# Patient Record
Sex: FEMALE | Race: WHITE | NOT HISPANIC OR LATINO | URBAN - METROPOLITAN AREA
[De-identification: names, ages, dates, MRNs, and addresses within clinical notes are randomized per-mention and may not be internally consistent; named-entity substitution may affect disease eponyms.]

---

## 2017-01-04 ENCOUNTER — OUTPATIENT (OUTPATIENT)
Dept: OUTPATIENT SERVICES | Facility: HOSPITAL | Age: 67
LOS: 1 days | End: 2017-01-04

## 2017-03-16 ENCOUNTER — OUTPATIENT (OUTPATIENT)
Dept: OUTPATIENT SERVICES | Facility: HOSPITAL | Age: 67
LOS: 1 days | End: 2017-03-16
Payer: MEDICARE

## 2017-03-16 PROCEDURE — 76536 US EXAM OF HEAD AND NECK: CPT | Mod: 26

## 2017-08-23 PROBLEM — Z00.00 ENCOUNTER FOR PREVENTIVE HEALTH EXAMINATION: Noted: 2017-08-23

## 2017-08-24 PROBLEM — C50.911 BREAST CANCER, RIGHT: Status: ACTIVE | Noted: 2017-08-24

## 2017-08-24 PROBLEM — Z80.3 FAMILY HISTORY OF MALIGNANT NEOPLASM OF BREAST: Status: ACTIVE | Noted: 2017-08-24

## 2017-08-24 PROBLEM — K76.0 HEPATIC STEATOSIS: Status: ACTIVE | Noted: 2017-08-24

## 2017-08-24 PROBLEM — Z78.9 NON-SMOKER: Status: ACTIVE | Noted: 2017-08-24

## 2017-08-25 ENCOUNTER — APPOINTMENT (OUTPATIENT)
Dept: SURGICAL ONCOLOGY | Facility: CLINIC | Age: 67
End: 2017-08-25
Payer: MEDICARE

## 2017-08-25 VITALS
HEART RATE: 66 BPM | RESPIRATION RATE: 16 BRPM | HEIGHT: 61 IN | SYSTOLIC BLOOD PRESSURE: 125 MMHG | TEMPERATURE: 98.3 F | OXYGEN SATURATION: 99 % | DIASTOLIC BLOOD PRESSURE: 68 MMHG

## 2017-08-25 DIAGNOSIS — Z80.3 FAMILY HISTORY OF MALIGNANT NEOPLASM OF BREAST: ICD-10-CM

## 2017-08-25 DIAGNOSIS — Z78.9 OTHER SPECIFIED HEALTH STATUS: ICD-10-CM

## 2017-08-25 DIAGNOSIS — K76.0 FATTY (CHANGE OF) LIVER, NOT ELSEWHERE CLASSIFIED: ICD-10-CM

## 2017-08-25 DIAGNOSIS — C50.911 MALIGNANT NEOPLASM OF UNSPECIFIED SITE OF RIGHT FEMALE BREAST: ICD-10-CM

## 2017-08-25 PROCEDURE — 99205 OFFICE O/P NEW HI 60 MIN: CPT

## 2017-08-28 ENCOUNTER — OUTPATIENT (OUTPATIENT)
Dept: OUTPATIENT SERVICES | Facility: HOSPITAL | Age: 67
LOS: 1 days | End: 2017-08-28

## 2017-08-28 DIAGNOSIS — Z00.8 ENCOUNTER FOR OTHER GENERAL EXAMINATION: ICD-10-CM

## 2017-08-30 ENCOUNTER — TRANSCRIPTION ENCOUNTER (OUTPATIENT)
Age: 67
End: 2017-08-30

## 2017-09-01 ENCOUNTER — RESULT REVIEW (OUTPATIENT)
Age: 67
End: 2017-09-01

## 2017-09-01 ENCOUNTER — OUTPATIENT (OUTPATIENT)
Dept: OUTPATIENT SERVICES | Facility: HOSPITAL | Age: 67
LOS: 1 days | End: 2017-09-01
Payer: SELF-PAY

## 2017-09-01 DIAGNOSIS — C50.911 MALIGNANT NEOPLASM OF UNSPECIFIED SITE OF RIGHT FEMALE BREAST: ICD-10-CM

## 2017-09-01 PROCEDURE — 88321 CONSLTJ&REPRT SLD PREP ELSWR: CPT

## 2017-09-05 LAB — SURGICAL PATHOLOGY STUDY: SIGNIFICANT CHANGE UP

## 2017-09-15 ENCOUNTER — OTHER (OUTPATIENT)
Age: 67
End: 2017-09-15

## 2017-09-15 ENCOUNTER — MESSAGE (OUTPATIENT)
Age: 67
End: 2017-09-15

## 2017-09-28 ENCOUNTER — APPOINTMENT (OUTPATIENT)
Dept: CT IMAGING | Facility: CLINIC | Age: 67
End: 2017-09-28

## 2017-09-28 ENCOUNTER — OUTPATIENT (OUTPATIENT)
Dept: OUTPATIENT SERVICES | Facility: HOSPITAL | Age: 67
LOS: 1 days | End: 2017-09-28
Payer: MEDICARE

## 2017-09-28 DIAGNOSIS — Z00.8 ENCOUNTER FOR OTHER GENERAL EXAMINATION: ICD-10-CM

## 2017-09-28 PROCEDURE — 74174 CTA ABD&PLVS W/CONTRAST: CPT | Mod: 26

## 2017-09-28 PROCEDURE — 74174 CTA ABD&PLVS W/CONTRAST: CPT

## 2017-09-28 PROCEDURE — 82565 ASSAY OF CREATININE: CPT

## 2017-10-13 ENCOUNTER — OUTPATIENT (OUTPATIENT)
Dept: OUTPATIENT SERVICES | Facility: HOSPITAL | Age: 67
LOS: 1 days | End: 2017-10-13
Payer: MEDICARE

## 2017-10-13 VITALS
HEART RATE: 60 BPM | TEMPERATURE: 98 F | SYSTOLIC BLOOD PRESSURE: 132 MMHG | DIASTOLIC BLOOD PRESSURE: 77 MMHG | HEIGHT: 61 IN | RESPIRATION RATE: 20 BRPM | WEIGHT: 166.89 LBS | OXYGEN SATURATION: 98 %

## 2017-10-13 DIAGNOSIS — Z86.79 PERSONAL HISTORY OF OTHER DISEASES OF THE CIRCULATORY SYSTEM: ICD-10-CM

## 2017-10-13 DIAGNOSIS — G47.33 OBSTRUCTIVE SLEEP APNEA (ADULT) (PEDIATRIC): ICD-10-CM

## 2017-10-13 DIAGNOSIS — Z85.3 PERSONAL HISTORY OF MALIGNANT NEOPLASM OF BREAST: ICD-10-CM

## 2017-10-13 DIAGNOSIS — Z01.818 ENCOUNTER FOR OTHER PREPROCEDURAL EXAMINATION: ICD-10-CM

## 2017-10-13 DIAGNOSIS — C50.411 MALIGNANT NEOPLASM OF UPPER-OUTER QUADRANT OF RIGHT FEMALE BREAST: ICD-10-CM

## 2017-10-13 DIAGNOSIS — Z98.891 HISTORY OF UTERINE SCAR FROM PREVIOUS SURGERY: Chronic | ICD-10-CM

## 2017-10-13 DIAGNOSIS — Z90.13 ACQUIRED ABSENCE OF BILATERAL BREASTS AND NIPPLES: ICD-10-CM

## 2017-10-13 DIAGNOSIS — E03.9 HYPOTHYROIDISM, UNSPECIFIED: ICD-10-CM

## 2017-10-13 DIAGNOSIS — Z90.710 ACQUIRED ABSENCE OF BOTH CERVIX AND UTERUS: Chronic | ICD-10-CM

## 2017-10-13 DIAGNOSIS — Z90.89 ACQUIRED ABSENCE OF OTHER ORGANS: Chronic | ICD-10-CM

## 2017-10-13 LAB
BLD GP AB SCN SERPL QL: NEGATIVE — SIGNIFICANT CHANGE UP
RH IG SCN BLD-IMP: POSITIVE — SIGNIFICANT CHANGE UP

## 2017-10-13 PROCEDURE — 86901 BLOOD TYPING SEROLOGIC RH(D): CPT

## 2017-10-13 PROCEDURE — 86900 BLOOD TYPING SEROLOGIC ABO: CPT

## 2017-10-13 PROCEDURE — 86850 RBC ANTIBODY SCREEN: CPT

## 2017-10-13 PROCEDURE — G0463: CPT

## 2017-10-13 RX ORDER — VANCOMYCIN HCL 1 G
1000 VIAL (EA) INTRAVENOUS ONCE
Qty: 0 | Refills: 0 | Status: DISCONTINUED | OUTPATIENT
Start: 2017-10-23 | End: 2017-10-25

## 2017-10-13 NOTE — H&P PST ADULT - PROBLEM SELECTOR PLAN 2
patient will continue antihypertensive medication including day of surgery   patient was evaluated by her cardiologist for upcoming surgery   ekg/ stress/ echo to be obtained from cardiologist office

## 2017-10-13 NOTE — H&P PST ADULT - PROBLEM SELECTOR PLAN 1
scheduled for bilateral mastectomies/ bilateral sentinel lymph node biopsies/ bilateral breast reconstruction/ DEBBI flap.  labs result to be obtained from oncologist office  patient is on Repatha auto, and  will bring injection to hospital the following day, pharmacy was notified.  blood type drawn at presurgical testing

## 2017-10-13 NOTE — H&P PST ADULT - HISTORY OF PRESENT ILLNESS
66 year old female with h/o JONAH (CPAP), HTN, Hypothyroidism, fatty liver, DVT right leg ( 2012), presents to presurgical testing for scheduled bilateral mastectomies/ bilateral sentinel lymph node biopsies/ bilateral breast reconstruction/ DEBBI flap for malignant neoplasm right breast. Patient is gene positive.

## 2017-10-13 NOTE — H&P PST ADULT - PMH
Deep vein thrombosis (DVT)  h/o right lower leg DVT post tibial fracture  Fatty liver    H/O malignant neoplasm of female breast  right breast  H/O osteoporosis    H/O: HTN (hypertension)    Hypothyroidism in adult    JONAH on CPAP

## 2017-10-13 NOTE — H&P PST ADULT - OTHER CARE PROVIDERS
Cardiologist: Dr. Genaro Wu # [phone]                 Oncologist: Dr. Vidal Wakefield # [phone]

## 2017-10-23 ENCOUNTER — RESULT REVIEW (OUTPATIENT)
Age: 67
End: 2017-10-23

## 2017-10-23 ENCOUNTER — INPATIENT (INPATIENT)
Facility: HOSPITAL | Age: 67
LOS: 2 days | Discharge: HOME HEALTH | DRG: 581 | End: 2017-10-26
Attending: PLASTIC SURGERY | Admitting: PLASTIC SURGERY
Payer: MEDICARE

## 2017-10-23 ENCOUNTER — APPOINTMENT (OUTPATIENT)
Dept: NUCLEAR MEDICINE | Facility: HOSPITAL | Age: 67
End: 2017-10-23

## 2017-10-23 VITALS
HEART RATE: 61 BPM | SYSTOLIC BLOOD PRESSURE: 155 MMHG | WEIGHT: 166.89 LBS | DIASTOLIC BLOOD PRESSURE: 80 MMHG | TEMPERATURE: 98 F | RESPIRATION RATE: 18 BRPM | OXYGEN SATURATION: 99 % | HEIGHT: 61 IN

## 2017-10-23 DIAGNOSIS — Z90.13 ACQUIRED ABSENCE OF BILATERAL BREASTS AND NIPPLES: ICD-10-CM

## 2017-10-23 DIAGNOSIS — C50.411 MALIGNANT NEOPLASM OF UPPER-OUTER QUADRANT OF RIGHT FEMALE BREAST: ICD-10-CM

## 2017-10-23 DIAGNOSIS — Z98.891 HISTORY OF UTERINE SCAR FROM PREVIOUS SURGERY: Chronic | ICD-10-CM

## 2017-10-23 DIAGNOSIS — Z90.710 ACQUIRED ABSENCE OF BOTH CERVIX AND UTERUS: Chronic | ICD-10-CM

## 2017-10-23 DIAGNOSIS — Z90.89 ACQUIRED ABSENCE OF OTHER ORGANS: Chronic | ICD-10-CM

## 2017-10-23 LAB
ANION GAP SERPL CALC-SCNC: 17 MMOL/L — SIGNIFICANT CHANGE UP (ref 5–17)
BUN SERPL-MCNC: 10 MG/DL — SIGNIFICANT CHANGE UP (ref 7–23)
CALCIUM SERPL-MCNC: 7.3 MG/DL — LOW (ref 8.4–10.5)
CHLORIDE SERPL-SCNC: 97 MMOL/L — SIGNIFICANT CHANGE UP (ref 96–108)
CO2 SERPL-SCNC: 21 MMOL/L — LOW (ref 22–31)
CREAT SERPL-MCNC: 0.75 MG/DL — SIGNIFICANT CHANGE UP (ref 0.5–1.3)
GLUCOSE SERPL-MCNC: 193 MG/DL — HIGH (ref 70–99)
HCT VFR BLD CALC: 35.6 % — SIGNIFICANT CHANGE UP (ref 34.5–45)
HGB BLD-MCNC: 11.7 G/DL — SIGNIFICANT CHANGE UP (ref 11.5–15.5)
MCHC RBC-ENTMCNC: 28.5 PG — SIGNIFICANT CHANGE UP (ref 27–34)
MCHC RBC-ENTMCNC: 32.9 GM/DL — SIGNIFICANT CHANGE UP (ref 32–36)
MCV RBC AUTO: 86.6 FL — SIGNIFICANT CHANGE UP (ref 80–100)
PLATELET # BLD AUTO: ABNORMAL (ref 150–400)
POTASSIUM SERPL-MCNC: 4.2 MMOL/L — SIGNIFICANT CHANGE UP (ref 3.5–5.3)
POTASSIUM SERPL-SCNC: 4.2 MMOL/L — SIGNIFICANT CHANGE UP (ref 3.5–5.3)
RBC # BLD: 4.11 M/UL — SIGNIFICANT CHANGE UP (ref 3.8–5.2)
RBC # FLD: 12.8 % — SIGNIFICANT CHANGE UP (ref 10.3–14.5)
SODIUM SERPL-SCNC: 135 MMOL/L — SIGNIFICANT CHANGE UP (ref 135–145)
WBC # BLD: 13.5 K/UL — HIGH (ref 3.8–10.5)
WBC # FLD AUTO: 13.5 K/UL — HIGH (ref 3.8–10.5)

## 2017-10-23 PROCEDURE — 88331 PATH CONSLTJ SURG 1 BLK 1SPC: CPT | Mod: 26

## 2017-10-23 PROCEDURE — 88302 TISSUE EXAM BY PATHOLOGIST: CPT | Mod: 26

## 2017-10-23 PROCEDURE — 88360 TUMOR IMMUNOHISTOCHEM/MANUAL: CPT | Mod: 26,59

## 2017-10-23 PROCEDURE — 88307 TISSUE EXAM BY PATHOLOGIST: CPT | Mod: 26

## 2017-10-23 RX ORDER — EVOLOCUMAB 140 MG/ML
0 INJECTION, SOLUTION SUBCUTANEOUS
Qty: 0 | Refills: 0 | COMMUNITY

## 2017-10-23 RX ORDER — KETOROLAC TROMETHAMINE 30 MG/ML
15 SYRINGE (ML) INJECTION EVERY 6 HOURS
Qty: 0 | Refills: 0 | Status: DISCONTINUED | OUTPATIENT
Start: 2017-10-23 | End: 2017-10-24

## 2017-10-23 RX ORDER — MORPHINE SULFATE 50 MG/1
30 CAPSULE, EXTENDED RELEASE ORAL
Qty: 0 | Refills: 0 | Status: DISCONTINUED | OUTPATIENT
Start: 2017-10-23 | End: 2017-10-24

## 2017-10-23 RX ORDER — MORPHINE SULFATE 50 MG/1
2 CAPSULE, EXTENDED RELEASE ORAL
Qty: 0 | Refills: 0 | Status: DISCONTINUED | OUTPATIENT
Start: 2017-10-23 | End: 2017-10-24

## 2017-10-23 RX ORDER — ACETAMINOPHEN 500 MG
975 TABLET ORAL EVERY 8 HOURS
Qty: 0 | Refills: 0 | Status: DISCONTINUED | OUTPATIENT
Start: 2017-10-23 | End: 2017-10-26

## 2017-10-23 RX ORDER — SODIUM CHLORIDE 9 MG/ML
3 INJECTION INTRAMUSCULAR; INTRAVENOUS; SUBCUTANEOUS EVERY 8 HOURS
Qty: 0 | Refills: 0 | Status: DISCONTINUED | OUTPATIENT
Start: 2017-10-23 | End: 2017-10-23

## 2017-10-23 RX ORDER — LOSARTAN POTASSIUM 100 MG/1
1 TABLET, FILM COATED ORAL
Qty: 0 | Refills: 0 | COMMUNITY

## 2017-10-23 RX ORDER — ANASTROZOLE 1 MG/1
1 TABLET ORAL
Qty: 0 | Refills: 0 | COMMUNITY

## 2017-10-23 RX ORDER — LIDOCAINE HCL 20 MG/ML
0.2 VIAL (ML) INJECTION ONCE
Qty: 0 | Refills: 0 | Status: DISCONTINUED | OUTPATIENT
Start: 2017-10-23 | End: 2017-10-23

## 2017-10-23 RX ORDER — ZOLPIDEM TARTRATE 10 MG/1
1 TABLET ORAL
Qty: 0 | Refills: 0 | COMMUNITY

## 2017-10-23 RX ORDER — MORPHINE SULFATE 50 MG/1
2 CAPSULE, EXTENDED RELEASE ORAL
Qty: 0 | Refills: 0 | Status: DISCONTINUED | OUTPATIENT
Start: 2017-10-23 | End: 2017-10-25

## 2017-10-23 RX ORDER — ONDANSETRON 8 MG/1
4 TABLET, FILM COATED ORAL ONCE
Qty: 0 | Refills: 0 | Status: DISCONTINUED | OUTPATIENT
Start: 2017-10-23 | End: 2017-10-24

## 2017-10-23 RX ORDER — DOCUSATE SODIUM 100 MG
100 CAPSULE ORAL THREE TIMES A DAY
Qty: 0 | Refills: 0 | Status: DISCONTINUED | OUTPATIENT
Start: 2017-10-23 | End: 2017-10-26

## 2017-10-23 RX ORDER — LEVOTHYROXINE SODIUM 125 MCG
1 TABLET ORAL
Qty: 0 | Refills: 0 | COMMUNITY

## 2017-10-23 RX ORDER — HEPARIN SODIUM 5000 [USP'U]/ML
5000 INJECTION INTRAVENOUS; SUBCUTANEOUS EVERY 12 HOURS
Qty: 0 | Refills: 0 | Status: DISCONTINUED | OUTPATIENT
Start: 2017-10-23 | End: 2017-10-26

## 2017-10-23 RX ORDER — NALOXONE HYDROCHLORIDE 4 MG/.1ML
0.1 SPRAY NASAL
Qty: 0 | Refills: 0 | Status: DISCONTINUED | OUTPATIENT
Start: 2017-10-23 | End: 2017-10-25

## 2017-10-23 RX ORDER — SODIUM CHLORIDE 9 MG/ML
1000 INJECTION, SOLUTION INTRAVENOUS
Qty: 0 | Refills: 0 | Status: DISCONTINUED | OUTPATIENT
Start: 2017-10-23 | End: 2017-10-25

## 2017-10-23 RX ORDER — ONDANSETRON 8 MG/1
4 TABLET, FILM COATED ORAL EVERY 6 HOURS
Qty: 0 | Refills: 0 | Status: DISCONTINUED | OUTPATIENT
Start: 2017-10-23 | End: 2017-10-26

## 2017-10-23 RX ADMIN — Medication 15 MILLIGRAM(S): at 23:29

## 2017-10-23 RX ADMIN — Medication 100 MILLIGRAM(S): at 22:29

## 2017-10-23 RX ADMIN — SODIUM CHLORIDE 3 MILLILITER(S): 9 INJECTION INTRAMUSCULAR; INTRAVENOUS; SUBCUTANEOUS at 06:57

## 2017-10-23 RX ADMIN — Medication 15 MILLIGRAM(S): at 23:45

## 2017-10-23 RX ADMIN — Medication 100 MILLIGRAM(S): at 23:27

## 2017-10-23 RX ADMIN — MORPHINE SULFATE 30 MILLILITER(S): 50 CAPSULE, EXTENDED RELEASE ORAL at 19:37

## 2017-10-23 RX ADMIN — MORPHINE SULFATE 30 MILLILITER(S): 50 CAPSULE, EXTENDED RELEASE ORAL at 22:09

## 2017-10-23 RX ADMIN — SODIUM CHLORIDE 125 MILLILITER(S): 9 INJECTION, SOLUTION INTRAVENOUS at 19:40

## 2017-10-23 RX ADMIN — Medication 975 MILLIGRAM(S): at 23:28

## 2017-10-23 NOTE — CHART NOTE - NSCHARTNOTEFT_GEN_A_CORE
Patient seen and examined.    Flaps soft and pink with 2-3 second capillary refill. Vioptix Right 58%, Left 65%.   Mastectomy skin flap ecchymosis noted.   No collections.  Abdomen (sans umbilicus) without collections.    Continue current management.

## 2017-10-23 NOTE — BRIEF OPERATIVE NOTE - PROCEDURE
<<-----Click on this checkbox to enter Procedure Breast reconstruction with DEBBI free flap  10/23/2017    Active  TSCOLARO

## 2017-10-24 LAB
ANION GAP SERPL CALC-SCNC: 13 MMOL/L — SIGNIFICANT CHANGE UP (ref 5–17)
BUN SERPL-MCNC: 9 MG/DL — SIGNIFICANT CHANGE UP (ref 7–23)
CALCIUM SERPL-MCNC: 7.6 MG/DL — LOW (ref 8.4–10.5)
CHLORIDE SERPL-SCNC: 98 MMOL/L — SIGNIFICANT CHANGE UP (ref 96–108)
CO2 SERPL-SCNC: 26 MMOL/L — SIGNIFICANT CHANGE UP (ref 22–31)
CREAT SERPL-MCNC: 0.67 MG/DL — SIGNIFICANT CHANGE UP (ref 0.5–1.3)
GLUCOSE SERPL-MCNC: 141 MG/DL — HIGH (ref 70–99)
HCT VFR BLD CALC: 32.5 % — LOW (ref 34.5–45)
HGB BLD-MCNC: 11.3 G/DL — LOW (ref 11.5–15.5)
MCHC RBC-ENTMCNC: 30.2 PG — SIGNIFICANT CHANGE UP (ref 27–34)
MCHC RBC-ENTMCNC: 34.8 GM/DL — SIGNIFICANT CHANGE UP (ref 32–36)
MCV RBC AUTO: 86.8 FL — SIGNIFICANT CHANGE UP (ref 80–100)
PLATELET # BLD AUTO: 140 K/UL — LOW (ref 150–400)
POTASSIUM SERPL-MCNC: 3.9 MMOL/L — SIGNIFICANT CHANGE UP (ref 3.5–5.3)
POTASSIUM SERPL-SCNC: 3.9 MMOL/L — SIGNIFICANT CHANGE UP (ref 3.5–5.3)
RBC # BLD: 3.74 M/UL — LOW (ref 3.8–5.2)
RBC # FLD: 12.6 % — SIGNIFICANT CHANGE UP (ref 10.3–14.5)
SODIUM SERPL-SCNC: 137 MMOL/L — SIGNIFICANT CHANGE UP (ref 135–145)
WBC # BLD: 12.4 K/UL — HIGH (ref 3.8–10.5)
WBC # FLD AUTO: 12.4 K/UL — HIGH (ref 3.8–10.5)

## 2017-10-24 RX ORDER — MORPHINE SULFATE 50 MG/1
30 CAPSULE, EXTENDED RELEASE ORAL
Qty: 0 | Refills: 0 | Status: DISCONTINUED | OUTPATIENT
Start: 2017-10-24 | End: 2017-10-25

## 2017-10-24 RX ADMIN — Medication 500 MILLIGRAM(S): at 18:23

## 2017-10-24 RX ADMIN — MORPHINE SULFATE 30 MILLILITER(S): 50 CAPSULE, EXTENDED RELEASE ORAL at 22:35

## 2017-10-24 RX ADMIN — ONDANSETRON 4 MILLIGRAM(S): 8 TABLET, FILM COATED ORAL at 00:01

## 2017-10-24 RX ADMIN — MORPHINE SULFATE 30 MILLILITER(S): 50 CAPSULE, EXTENDED RELEASE ORAL at 09:46

## 2017-10-24 RX ADMIN — Medication 15 MILLIGRAM(S): at 06:08

## 2017-10-24 RX ADMIN — ONDANSETRON 4 MILLIGRAM(S): 8 TABLET, FILM COATED ORAL at 13:09

## 2017-10-24 RX ADMIN — SODIUM CHLORIDE 125 MILLILITER(S): 9 INJECTION, SOLUTION INTRAVENOUS at 04:00

## 2017-10-24 RX ADMIN — Medication 500 MILLIGRAM(S): at 06:40

## 2017-10-24 RX ADMIN — Medication 975 MILLIGRAM(S): at 08:00

## 2017-10-24 RX ADMIN — ONDANSETRON 4 MILLIGRAM(S): 8 TABLET, FILM COATED ORAL at 06:00

## 2017-10-24 RX ADMIN — Medication 500 MILLIGRAM(S): at 06:07

## 2017-10-24 RX ADMIN — Medication 15 MILLIGRAM(S): at 06:20

## 2017-10-24 RX ADMIN — Medication 975 MILLIGRAM(S): at 15:50

## 2017-10-24 RX ADMIN — Medication 100 MILLIGRAM(S): at 20:52

## 2017-10-24 RX ADMIN — Medication 975 MILLIGRAM(S): at 23:04

## 2017-10-24 RX ADMIN — Medication 202 MILLIGRAM(S): at 08:25

## 2017-10-24 RX ADMIN — ONDANSETRON 4 MILLIGRAM(S): 8 TABLET, FILM COATED ORAL at 23:04

## 2017-10-24 RX ADMIN — Medication 975 MILLIGRAM(S): at 00:00

## 2017-10-24 RX ADMIN — HEPARIN SODIUM 5000 UNIT(S): 5000 INJECTION INTRAVENOUS; SUBCUTANEOUS at 18:22

## 2017-10-24 RX ADMIN — Medication 100 MILLIGRAM(S): at 06:07

## 2017-10-24 RX ADMIN — Medication 975 MILLIGRAM(S): at 07:00

## 2017-10-24 RX ADMIN — Medication 975 MILLIGRAM(S): at 23:30

## 2017-10-24 RX ADMIN — Medication 100 MILLIGRAM(S): at 15:20

## 2017-10-24 RX ADMIN — HEPARIN SODIUM 5000 UNIT(S): 5000 INJECTION INTRAVENOUS; SUBCUTANEOUS at 06:08

## 2017-10-24 RX ADMIN — Medication 15 MILLIGRAM(S): at 13:30

## 2017-10-24 RX ADMIN — Medication 100 MILLIGRAM(S): at 15:22

## 2017-10-24 RX ADMIN — MORPHINE SULFATE 30 MILLILITER(S): 50 CAPSULE, EXTENDED RELEASE ORAL at 08:08

## 2017-10-24 RX ADMIN — Medication 15 MILLIGRAM(S): at 13:09

## 2017-10-24 RX ADMIN — MORPHINE SULFATE 30 MILLILITER(S): 50 CAPSULE, EXTENDED RELEASE ORAL at 19:12

## 2017-10-24 RX ADMIN — Medication 975 MILLIGRAM(S): at 15:21

## 2017-10-24 NOTE — PROGRESS NOTE ADULT - SUBJECTIVE AND OBJECTIVE BOX
POD1 s/p Bilateral mastectomies, SLNBx, and DEBBI flap breast reconstruction.    No major events overnight. Nausea overnight, controlled with IV anti-emetics.    ICU Vital Signs Last 24 Hrs  T(C): 36.8 (24 Oct 2017 06:00), Max: 36.8 (24 Oct 2017 06:00)  T(F): 98.2 (24 Oct 2017 06:00), Max: 98.2 (24 Oct 2017 06:00)  HR: 74 (24 Oct 2017 06:00) (61 - 85)  BP: 106/54 (24 Oct 2017 06:00) (99/58 - 121/57)  BP(mean): 78 (24 Oct 2017 06:00) (70 - 86)  RR: 14 (24 Oct 2017 06:00) (14 - 22)  SpO2: 100% (24 Oct 2017 06:00) (96% - 100%)    Flaps soft, well perfused. Mastectomy skin flaps with mild ecchymosis.  Vx 62, 66.    A/P  Antibiotics  Vioptix  Hills  OOB to chair  Naproxen, Toradol  DVT ppx POD1 s/p Bilateral mastectomies, SLNBx, and DEBBI flap breast reconstruction.    No major events overnight. Nausea overnight, controlled with IV anti-emetics.    ICU Vital Signs Last 24 Hrs  T(C): 36.8 (24 Oct 2017 06:00), Max: 36.8 (24 Oct 2017 06:00)  T(F): 98.2 (24 Oct 2017 06:00), Max: 98.2 (24 Oct 2017 06:00)  HR: 74 (24 Oct 2017 06:00) (61 - 85)  BP: 106/54 (24 Oct 2017 06:00) (99/58 - 121/57)  BP(mean): 78 (24 Oct 2017 06:00) (70 - 86)  RR: 14 (24 Oct 2017 06:00) (14 - 22)  SpO2: 100% (24 Oct 2017 06:00) (96% - 100%)    Flaps soft, well perfused. Mastectomy skin flaps with mild ecchymosis. Abdominal incisions c/d/i. JPs serosang.  Vx 62, 66.    A/P  Reg diet  Antibiotics  Vioptix  Hills  OOB to chair  Naproxen, Toradol  DVT ppx

## 2017-10-24 NOTE — PROGRESS NOTE ADULT - SUBJECTIVE AND OBJECTIVE BOX
Day 1 of Anesthesia Pain Management Service    SUBJECTIVE: Patient is doing well with IV PCA    Pain Scale Score:	[X] Refer to charted pain scores    THERAPY:    [X ] IV PCA Morphine		[X ] 5 mg/mL	[ ] 1 mg/mL  [  ] IV PCA Hydromorphone	[ ] 5 mg/mL	[ ] 1 mg/mL  [ ] IV PCA Fentanyl		[ ] 50 micrograms/mL    Demand dose: 1 mg     Lockout: 6 minutes   Continuous Rate: 0 mg/hr  4 Hour Limit: 12 mg    MEDICATIONS  (STANDING):  acetaminophen   Tablet. 975 milliGRAM(s) Oral every 8 hours  clindamycin IVPB 900 milliGRAM(s) IV Intermittent every 8 hours  docusate sodium 100 milliGRAM(s) Oral three times a day  heparin  Injectable 5000 Unit(s) SubCutaneous every 12 hours  ketorolac   Injectable 15 milliGRAM(s) IV Push every 6 hours  lactated ringers. 1000 milliLiter(s) (125 mL/Hr) IV Continuous <Continuous>  morphine PCA (5 mG/mL) 30 milliLiter(s) PCA Continuous PCA Continuous  naproxen 500 milliGRAM(s) Oral two times a day  vancomycin  IVPB 1000 milliGRAM(s) IV Intermittent once    MEDICATIONS  (PRN):  morphine PCA (5 mG/mL) Rescue Clinician Bolus 2 milliGRAM(s) IV Push every 15 minutes PRN for Pain Scale GREATER THAN 6  naloxone Injectable 0.1 milliGRAM(s) IV Push every 3 minutes PRN For ANY of the following changes in patient status:  A. RR LESS THAN 10 breaths per minute, B. Oxygen saturation LESS THAN 90%, C. Sedation score of 6  ondansetron Injectable 4 milliGRAM(s) IV Push every 6 hours PRN Nausea      OBJECTIVE:    Sedation Score:	[ X] Alert	[ ] Drowsy 	[ ] Arousable	[ ] Asleep	[ ] Unresponsive    Side Effects:	[  ] None	[X ] Nausea: antiemetics PRN	[ ] Vomiting	[ ] Pruritus  		[ ] Other:    Vital Signs Last 24 Hrs  T(C): 36.6 (24 Oct 2017 10:20), Max: 36.8 (24 Oct 2017 06:00)  T(F): 97.9 (24 Oct 2017 10:20), Max: 98.2 (24 Oct 2017 06:00)  HR: 62 (24 Oct 2017 10:20) (61 - 85)  BP: 104/61 (24 Oct 2017 10:20) (97/52 - 121/57)  BP(mean): 68 (24 Oct 2017 07:00) (68 - 86)  RR: 18 (24 Oct 2017 10:20) (14 - 22)  SpO2: 100% (24 Oct 2017 10:20) (96% - 100%)    ASSESSMENT/ PLAN    Therapy to  be:               [X] Continued   [ ] Discontinued   [ ] Changed to PRN Analgesics    Documentation and Verification of current medications:   [X] Done   [ ] Not done, not eligible    Comments: Demand dose decreased to 0.5mg secondary to nausea. Medicated with phenergan IV. Not using PCA. Tylenol po ATC, Toradol and Naprosyn ordered. Reeducated to use.

## 2017-10-24 NOTE — PROGRESS NOTE ADULT - SUBJECTIVE AND OBJECTIVE BOX
No events  Pain controlled  AVSS  Vioptix 64/66  Flaps viable  Skin viable  Inc c/d/i  Dr CONTRERAS    OK to floor  Reg diet  HSQ  ASA 81  Private room (immune def - IgA def)  CPAP PRN

## 2017-10-25 ENCOUNTER — TRANSCRIPTION ENCOUNTER (OUTPATIENT)
Age: 67
End: 2017-10-25

## 2017-10-25 RX ORDER — OXYCODONE HYDROCHLORIDE 5 MG/1
5 TABLET ORAL EVERY 4 HOURS
Qty: 0 | Refills: 0 | Status: DISCONTINUED | OUTPATIENT
Start: 2017-10-25 | End: 2017-10-26

## 2017-10-25 RX ORDER — MOXIFLOXACIN HYDROCHLORIDE TABLETS, 400 MG 400 MG/1
1 TABLET, FILM COATED ORAL
Qty: 28 | Refills: 0 | OUTPATIENT
Start: 2017-10-25 | End: 2017-11-08

## 2017-10-25 RX ORDER — OXYCODONE HYDROCHLORIDE 5 MG/1
5 TABLET ORAL ONCE
Qty: 0 | Refills: 0 | Status: DISCONTINUED | OUTPATIENT
Start: 2017-10-25 | End: 2017-10-25

## 2017-10-25 RX ORDER — DOCOSANOL 100 MG/G
1 CREAM TOPICAL DAILY
Qty: 0 | Refills: 0 | Status: DISCONTINUED | OUTPATIENT
Start: 2017-10-25 | End: 2017-10-26

## 2017-10-25 RX ORDER — OXYCODONE HYDROCHLORIDE 5 MG/1
10 TABLET ORAL EVERY 4 HOURS
Qty: 0 | Refills: 0 | Status: DISCONTINUED | OUTPATIENT
Start: 2017-10-25 | End: 2017-10-26

## 2017-10-25 RX ORDER — DOCOSANOL 100 MG/G
1 CREAM TOPICAL
Qty: 0 | Refills: 0 | Status: DISCONTINUED | OUTPATIENT
Start: 2017-10-25 | End: 2017-10-26

## 2017-10-25 RX ORDER — DIPHENHYDRAMINE HCL 50 MG
50 CAPSULE ORAL EVERY 4 HOURS
Qty: 0 | Refills: 0 | Status: DISCONTINUED | OUTPATIENT
Start: 2017-10-25 | End: 2017-10-26

## 2017-10-25 RX ORDER — ACETAMINOPHEN 500 MG
650 TABLET ORAL EVERY 6 HOURS
Qty: 0 | Refills: 0 | Status: DISCONTINUED | OUTPATIENT
Start: 2017-10-25 | End: 2017-10-26

## 2017-10-25 RX ADMIN — DOCOSANOL 1 APPLICATION(S): 100 CREAM TOPICAL at 18:31

## 2017-10-25 RX ADMIN — Medication 100 MILLIGRAM(S): at 20:56

## 2017-10-25 RX ADMIN — Medication 975 MILLIGRAM(S): at 16:55

## 2017-10-25 RX ADMIN — Medication 100 MILLIGRAM(S): at 05:01

## 2017-10-25 RX ADMIN — ONDANSETRON 4 MILLIGRAM(S): 8 TABLET, FILM COATED ORAL at 05:02

## 2017-10-25 RX ADMIN — Medication 500 MILLIGRAM(S): at 05:02

## 2017-10-25 RX ADMIN — Medication 100 MILLIGRAM(S): at 14:14

## 2017-10-25 RX ADMIN — Medication 975 MILLIGRAM(S): at 23:30

## 2017-10-25 RX ADMIN — OXYCODONE HYDROCHLORIDE 5 MILLIGRAM(S): 5 TABLET ORAL at 18:21

## 2017-10-25 RX ADMIN — Medication 500 MILLIGRAM(S): at 05:45

## 2017-10-25 RX ADMIN — HEPARIN SODIUM 5000 UNIT(S): 5000 INJECTION INTRAVENOUS; SUBCUTANEOUS at 05:02

## 2017-10-25 RX ADMIN — OXYCODONE HYDROCHLORIDE 5 MILLIGRAM(S): 5 TABLET ORAL at 14:12

## 2017-10-25 RX ADMIN — Medication 975 MILLIGRAM(S): at 09:30

## 2017-10-25 RX ADMIN — HEPARIN SODIUM 5000 UNIT(S): 5000 INJECTION INTRAVENOUS; SUBCUTANEOUS at 18:19

## 2017-10-25 RX ADMIN — Medication 500 MILLIGRAM(S): at 18:19

## 2017-10-25 RX ADMIN — Medication 975 MILLIGRAM(S): at 23:05

## 2017-10-25 RX ADMIN — OXYCODONE HYDROCHLORIDE 5 MILLIGRAM(S): 5 TABLET ORAL at 23:04

## 2017-10-25 RX ADMIN — OXYCODONE HYDROCHLORIDE 5 MILLIGRAM(S): 5 TABLET ORAL at 23:28

## 2017-10-25 NOTE — CHART NOTE - NSCHARTNOTEFT_GEN_A_CORE
Patient seen and examined.    Flaps soft, pink, and viable with 2-3 second capillary refill.  Right breast slightly more swollen than left. No collections appreciated.    Continue current management.

## 2017-10-25 NOTE — DISCHARGE NOTE ADULT - MEDICATION SUMMARY - MEDICATIONS TO TAKE
I will START or STAY ON the medications listed below when I get home from the hospital:    Percocet 5/325 oral tablet  -- 1 tab(s) by mouth every 6 hours, As Needed - for moderate pain  -- Indication: For Post-operative pain medication    losartan 50 mg oral tablet  -- 1 tab(s) by mouth once a day  -- Indication: For Home medication    Repatha 140 mg/mL subcutaneous solution  -- every two weeks  last injection 9/11/2017  -- Indication: For Home medication    anastrozole 1 mg oral tablet  -- 1 tab(s) by mouth once a day  -- Indication: For Home medication    zolpidem 10 mg oral tablet  -- 1 tab(s) by mouth once a day (at bedtime)  -- Indication: For Home medication    hydroCHLOROthiazide 12.5 mg oral tablet  -- 1 tab(s) by mouth once a day  -- Indication: For Home medication    Cipro 500 mg oral tablet  -- 1 tab(s) by mouth every 12 hours   -- Avoid prolonged or excessive exposure to direct and/or artificial sunlight while taking this medication.  Check with your doctor before becoming pregnant.  Do not take dairy products, antacids, or iron preparations within one hour of this medication.  Finish all this medication unless otherwise directed by prescriber.  Medication should be taken with plenty of water.    -- Indication: For Post-operative antibiotic    levothyroxine 50 mcg (0.05 mg) oral tablet  -- 1 tab(s) by mouth once a day  -- Indication: For Home medication I will START or STAY ON the medications listed below when I get home from the hospital:    Percocet 5/325 oral tablet  -- 1 tab(s) by mouth every 6 hours, As Needed - for moderate pain  -- Indication: For Post-operative pain medication    aspirin 325 mg oral tablet  -- 1 tab(s) by mouth once a day MDD:1  -- Take with food or milk.    -- Indication: For Microvascular patency    losartan 50 mg oral tablet  -- 1 tab(s) by mouth once a day  -- Indication: For Home medication    Repatha 140 mg/mL subcutaneous solution  -- every two weeks  last injection 9/11/2017  -- Indication: For Home medication    anastrozole 1 mg oral tablet  -- 1 tab(s) by mouth once a day  -- Indication: For Home medication    zolpidem 10 mg oral tablet  -- 1 tab(s) by mouth once a day (at bedtime)  -- Indication: For Home medication    hydroCHLOROthiazide 12.5 mg oral tablet  -- 1 tab(s) by mouth once a day  -- Indication: For Home medication    Cipro 500 mg oral tablet  -- 1 tab(s) by mouth every 12 hours   -- Avoid prolonged or excessive exposure to direct and/or artificial sunlight while taking this medication.  Check with your doctor before becoming pregnant.  Do not take dairy products, antacids, or iron preparations within one hour of this medication.  Finish all this medication unless otherwise directed by prescriber.  Medication should be taken with plenty of water.    -- Indication: For Post-operative antibiotic    levothyroxine 50 mcg (0.05 mg) oral tablet  -- 1 tab(s) by mouth once a day  -- Indication: For Home medication

## 2017-10-25 NOTE — PROGRESS NOTE ADULT - SUBJECTIVE AND OBJECTIVE BOX
SUBJECTIVE:  Doing well.   No overnight events.     OBJECTIVE:     ** VITAL SIGNS / I&O's **    T(C): 37 (10-25-17 @ 06:47), Max: 37.3 (10-24-17 @ 17:10)  T(F): 98.6 (10-25-17 @ 06:47), Max: 99.2 (10-24-17 @ 17:10)  HR: 61 (10-25-17 @ 06:47) (58 - 75)  BP: 126/69 (10-25-17 @ 06:47) (95/58 - 126/69)  RR: 18 (10-25-17 @ 06:47) (18 - 18)  SpO2: 100% (10-25-17 @ 06:47) (98% - 100%)      24 Oct 2017 07:01  -  25 Oct 2017 07:00  --------------------------------------------------------  IN:    IV PiggyBack: 150 mL    lactated ringers.: 2600 mL    Oral Fluid: 360 mL  Total IN: 3110 mL    OUT:    Bulb: 45 mL    Bulb: 65 mL    Bulb: 20 mL    Bulb: 30 mL    Bulb: 30 mL    Bulb: 25 mL    Indwelling Catheter - Urethral: 3555 mL  Total OUT: 3770 mL    Total NET: -660 mL      ** PHYSICAL EXAM **    -- CONSTITUTIONAL: AOx3. NAD.   -- RIGHT BREAST / FLAP: Mastectomy skin flap ecchymosis, stable. No collections. Flap soft and pink with 2-3 second capillary refill. Vioptix: 74%. Drain(s) serosanguinous.  -- LEFT BREAST / FLAP: Mastectomy skin flap ecchymosis, stable. No collections. Flap soft and pink with 2-3 second capillary refill. Vioptix: 68%. Drain(s) serosanguinous.  -- CARDIOVASCULAR: Regular rate and rhythm. S1, S2.  -- RESPIRATORY: Bilateral breath sounds.   -- ABDOMEN: Soft. No collections. Incision intact with overlying Prineo. Drains serosanguinous.

## 2017-10-25 NOTE — PROGRESS NOTE ADULT - SUBJECTIVE AND OBJECTIVE BOX
NAD  Afebrile, VSS  ViOptix 55/64 stable  bilaterally, removed  Bilateral breast free flap viable, closures intact.  Abdomen flat, closure intact.  Drains serosang.  meyer removed    oob ambulate  po pain meds  lovenox

## 2017-10-25 NOTE — PROGRESS NOTE ADULT - ASSESSMENT
66F s/p bilateral mastectomies and bilateral breast reconstruction with DEBBI flaps  >> d/c Hills  >> d/c IVF  >> d/c O2  >> Ambulate as tolerated  >> Pain control with PO analgesia  >> Regular diet  >> DVT prophylaxis  >> Continue antibiotics  >> Continue drains // Drain care  >> LEONORA teaching  >> Dispo planning 66F s/p bilateral mastectomies and bilateral breast reconstruction with DEBBI flaps  >> d/c Hills  >> d/c IVF  >> d/c O2  >> d/c Vioptix per attending surgeon  >> q4H flap monitoring  >> Ambulate as tolerated  >> Pain control with PO analgesia  >> Regular diet  >> DVT prophylaxis  >> Continue antibiotics  >> Continue drains // Drain care  >> LEONORA teaching  >> Dispo planning

## 2017-10-25 NOTE — DISCHARGE NOTE ADULT - CARE PLAN
Principal Discharge DX:	H/O malignant neoplasm of female breast  Goal:	See below  Instructions for follow-up, activity and diet:	Please follow the instructions given to you by Dr. Reyes Principal Discharge DX:	H/O malignant neoplasm of female breast  Goal:	See below  Instructions for follow-up, activity and diet:	Please follow the instructions given to you by Dr. Reyes regarding showering.  Ambulate as tolerated, take diet as tolerated. You will receive nursing visits to help you manage the drains and check your wounds. You should empty and record drain output twice daily.  Pain medication prescription already provided by Dr. Reyes. Take as prescribed.  Post-operative antibiotics have been sent to your [facility]. Please take as prescribed immediately after discharged.

## 2017-10-25 NOTE — PROGRESS NOTE ADULT - SUBJECTIVE AND OBJECTIVE BOX
Day 2 of Anesthesia Pain Management Service    SUBJECTIVE: Patient is doing well with IV PCA    Pain Scale Score:	[X] Refer to charted pain scores    THERAPY:    [X ] IV PCA Morphine		[X ] 5 mg/mL	[ ] 1 mg/mL  [ ] IV PCA Hydromorphone	[ ] 5 mg/mL	[  ] 1 mg/mL  [ ] IV PCA Fentanyl		[ ] 50 micrograms/mL    Demand dose: 0.5 mg     Lockout: 6 minutes   Continuous Rate: 0 mg/hr  4 Hour Limit: 12 mg    MEDICATIONS  (STANDING):  acetaminophen   Tablet. 975 milliGRAM(s) Oral every 8 hours  clindamycin IVPB 900 milliGRAM(s) IV Intermittent every 8 hours  docusate sodium 100 milliGRAM(s) Oral three times a day  heparin  Injectable 5000 Unit(s) SubCutaneous every 12 hours  naproxen 500 milliGRAM(s) Oral two times a day    MEDICATIONS  (PRN):  acetaminophen   Tablet 650 milliGRAM(s) Oral every 6 hours PRN For Temp greater than 38 C (100.4 F)  aluminum hydroxide/magnesium hydroxide/simethicone Suspension 30 milliLiter(s) Oral every 4 hours PRN Dyspepsia  diphenhydrAMINE   Capsule 50 milliGRAM(s) Oral every 4 hours PRN Rash and/or Itching  ondansetron Injectable 4 milliGRAM(s) IV Push every 6 hours PRN Nausea  oxyCODONE    IR 5 milliGRAM(s) Oral every 4 hours PRN Moderate Pain (4 - 6)  oxyCODONE    IR 10 milliGRAM(s) Oral every 4 hours PRN Severe Pain (7 - 10)  promethazine IVPB 12.5 milliGRAM(s) IV Intermittent once PRN Nausea if zofran ineffective      OBJECTIVE:    Sedation Score:	[ X] Alert	[ ] Drowsy 	[ ] Arousable	[ ] Asleep	[ ] Unresponsive    Side Effects:	[X ] None	[ ] Nausea	[ ] Vomiting	[ ] Pruritus  		[ ] Other:    Vital Signs Last 24 Hrs  T(C): 37.1 (25 Oct 2017 10:11), Max: 37.3 (24 Oct 2017 17:10)  T(F): 98.7 (25 Oct 2017 10:11), Max: 99.2 (24 Oct 2017 17:10)  HR: 55 (25 Oct 2017 10:11) (55 - 75)  BP: 110/66 (25 Oct 2017 10:11) (95/58 - 126/69)  BP(mean): --  RR: 18 (25 Oct 2017 10:11) (18 - 18)  SpO2: 95% (25 Oct 2017 10:11) (95% - 100%)    ASSESSMENT/ PLAN    Therapy to  be:               [X] Continued   [ ] Discontinued   [ ] Changed to PRN Analgesics    Documentation and Verification of current medications:   [X] Done	[ ] Not done, not eligible    Comments: PCA D\C'd by primary service

## 2017-10-25 NOTE — PROGRESS NOTE ADULT - SUBJECTIVE AND OBJECTIVE BOX
Pain Management Attending Addendum    SUBJECTIVE: Patient doing well with IV PCA    Therapy:    [X] IV PCA         [ ] PRN Analgesics    OBJECTIVE:   [X] Pain appropriately controlled    [ ] Other:    Side Effects:  [X] None	             [ ] Nausea              [ ] Pruritis                	[ ] Other:    ASSESSMENT/PLAN:  Therapy changed to PRN analgesics    Comments:

## 2017-10-25 NOTE — DISCHARGE NOTE ADULT - HOSPITAL COURSE
66F underwent bilateral mastectomies and bilateral breast reconstruction with DEBBI flaps in the OR. The patient tolerated the procedure well. Postoperatively the patient was sent to the PACU. The patient remained in the PACU overnight. The patient's flaps were monitored by Vioptix and by clinical examination. On POD 1, the patient was hemodynamically stable; was transferred to a surgical floor; was advanced to a regular diet; was placed on her home medications; and was out of bed to a chair. On POD 2, the patient's Hills catheter was removed; Vioptix monitors were removed; and the patient ambulated. During the patient's hospital course, the patient's pain was controlled by IV pain medications and then by PO pain medications.    At the time of discharge, the patient was hemodynamically stable, was tolerating PO diet, was voiding urine, was ambulating, and was comfortable with adequate pain control. The patient was instructed to follow up with Dr. Reyes within x1 week(s) after discharge from the hospital and Dr. Gracia within x1 week(s) after discharge from the hospital. The patient/family felt comfortable with discharge. The patient had no other issues.

## 2017-10-25 NOTE — DISCHARGE NOTE ADULT - CARE PROVIDER_API CALL
Travis Reyes), Plastic Surgery  [address]  [address]  [place] [address]  Phone: [phone]  Fax: [phone]    Audrey Gracia), Surgery  [address]  [address]  [place] [address]  Phone: [phone]  Fax: [phone]

## 2017-10-25 NOTE — DISCHARGE NOTE ADULT - PATIENT PORTAL LINK FT
“You can access the Follow[employer] Patient Portal, offered by [facility], by registering with the following website: [email or web address]”

## 2017-10-25 NOTE — DISCHARGE NOTE ADULT - INSTRUCTIONS
The patient may resume a regular diet. call MD // go to ER:  temperature, nausea, vomiting; check site daily for redness, warmth, swelling, bleeding, drainage with foul odor at drain sites and abdominal incision site

## 2017-10-25 NOTE — DISCHARGE NOTE ADULT - HOME CARE AGENCY
[facility] [phone] will reinforce patricia teaching.  Please call [facility] for any questions regarding your care.

## 2017-10-26 VITALS
DIASTOLIC BLOOD PRESSURE: 73 MMHG | TEMPERATURE: 98 F | OXYGEN SATURATION: 98 % | RESPIRATION RATE: 18 BRPM | SYSTOLIC BLOOD PRESSURE: 149 MMHG | HEART RATE: 59 BPM

## 2017-10-26 LAB — SURGICAL PATHOLOGY STUDY: SIGNIFICANT CHANGE UP

## 2017-10-26 RX ORDER — ASPIRIN/CALCIUM CARB/MAGNESIUM 324 MG
1 TABLET ORAL
Qty: 14 | Refills: 0 | OUTPATIENT
Start: 2017-10-26 | End: 2017-11-05

## 2017-10-26 RX ADMIN — DOCOSANOL 1 APPLICATION(S): 100 CREAM TOPICAL at 05:04

## 2017-10-26 RX ADMIN — OXYCODONE HYDROCHLORIDE 5 MILLIGRAM(S): 5 TABLET ORAL at 08:15

## 2017-10-26 RX ADMIN — Medication 975 MILLIGRAM(S): at 11:00

## 2017-10-26 RX ADMIN — HEPARIN SODIUM 5000 UNIT(S): 5000 INJECTION INTRAVENOUS; SUBCUTANEOUS at 05:03

## 2017-10-26 RX ADMIN — Medication 500 MILLIGRAM(S): at 05:04

## 2017-10-26 RX ADMIN — Medication 500 MILLIGRAM(S): at 05:40

## 2017-10-26 RX ADMIN — Medication 100 MILLIGRAM(S): at 05:04

## 2017-10-26 RX ADMIN — OXYCODONE HYDROCHLORIDE 5 MILLIGRAM(S): 5 TABLET ORAL at 04:00

## 2017-10-26 RX ADMIN — OXYCODONE HYDROCHLORIDE 5 MILLIGRAM(S): 5 TABLET ORAL at 03:02

## 2017-10-27 ENCOUNTER — TRANSCRIPTION ENCOUNTER (OUTPATIENT)
Age: 67
End: 2017-10-27

## 2017-11-22 ENCOUNTER — RESULT REVIEW (OUTPATIENT)
Age: 67
End: 2017-11-22

## 2017-12-19 PROCEDURE — C1889: CPT

## 2017-12-19 PROCEDURE — A9541: CPT

## 2017-12-19 PROCEDURE — C1769: CPT

## 2017-12-19 PROCEDURE — 88331 PATH CONSLTJ SURG 1 BLK 1SPC: CPT

## 2017-12-19 PROCEDURE — 82330 ASSAY OF CALCIUM: CPT

## 2017-12-19 PROCEDURE — 84295 ASSAY OF SERUM SODIUM: CPT

## 2017-12-19 PROCEDURE — 82435 ASSAY OF BLOOD CHLORIDE: CPT

## 2017-12-19 PROCEDURE — C1781: CPT

## 2017-12-19 PROCEDURE — 84132 ASSAY OF SERUM POTASSIUM: CPT

## 2017-12-19 PROCEDURE — 83605 ASSAY OF LACTIC ACID: CPT

## 2017-12-19 PROCEDURE — 85014 HEMATOCRIT: CPT

## 2017-12-19 PROCEDURE — 85027 COMPLETE CBC AUTOMATED: CPT

## 2017-12-19 PROCEDURE — 88302 TISSUE EXAM BY PATHOLOGIST: CPT

## 2017-12-19 PROCEDURE — 88307 TISSUE EXAM BY PATHOLOGIST: CPT

## 2017-12-19 PROCEDURE — 82947 ASSAY GLUCOSE BLOOD QUANT: CPT

## 2017-12-19 PROCEDURE — 80048 BASIC METABOLIC PNL TOTAL CA: CPT

## 2017-12-19 PROCEDURE — 82803 BLOOD GASES ANY COMBINATION: CPT

## 2017-12-19 PROCEDURE — 88360 TUMOR IMMUNOHISTOCHEM/MANUAL: CPT

## 2017-12-19 PROCEDURE — 82565 ASSAY OF CREATININE: CPT

## 2018-01-24 ENCOUNTER — OUTPATIENT (OUTPATIENT)
Dept: OUTPATIENT SERVICES | Facility: HOSPITAL | Age: 68
LOS: 1 days | End: 2018-01-24
Payer: MEDICARE

## 2018-01-24 DIAGNOSIS — Z98.891 HISTORY OF UTERINE SCAR FROM PREVIOUS SURGERY: Chronic | ICD-10-CM

## 2018-01-24 DIAGNOSIS — Z85.3 PERSONAL HISTORY OF MALIGNANT NEOPLASM OF BREAST: ICD-10-CM

## 2018-01-24 DIAGNOSIS — Z90.710 ACQUIRED ABSENCE OF BOTH CERVIX AND UTERUS: Chronic | ICD-10-CM

## 2018-01-24 DIAGNOSIS — Z90.13 ACQUIRED ABSENCE OF BILATERAL BREASTS AND NIPPLES: ICD-10-CM

## 2018-01-24 DIAGNOSIS — N65.0 DEFORMITY OF RECONSTRUCTED BREAST: ICD-10-CM

## 2018-01-24 DIAGNOSIS — Z01.818 ENCOUNTER FOR OTHER PREPROCEDURAL EXAMINATION: ICD-10-CM

## 2018-01-24 DIAGNOSIS — I10 ESSENTIAL (PRIMARY) HYPERTENSION: ICD-10-CM

## 2018-01-24 DIAGNOSIS — Z90.89 ACQUIRED ABSENCE OF OTHER ORGANS: Chronic | ICD-10-CM

## 2018-01-24 PROCEDURE — 93010 ELECTROCARDIOGRAM REPORT: CPT | Mod: NC

## 2018-01-24 PROCEDURE — 36415 COLL VENOUS BLD VENIPUNCTURE: CPT

## 2018-01-24 PROCEDURE — 80048 BASIC METABOLIC PNL TOTAL CA: CPT

## 2018-01-24 PROCEDURE — 93005 ELECTROCARDIOGRAM TRACING: CPT

## 2018-01-24 PROCEDURE — G0463: CPT

## 2018-01-24 PROCEDURE — 85027 COMPLETE CBC AUTOMATED: CPT

## 2018-02-07 ENCOUNTER — TRANSCRIPTION ENCOUNTER (OUTPATIENT)
Age: 68
End: 2018-02-07

## 2018-02-07 ENCOUNTER — OUTPATIENT (OUTPATIENT)
Dept: OUTPATIENT SERVICES | Facility: HOSPITAL | Age: 68
LOS: 1 days | End: 2018-02-07
Payer: MEDICARE

## 2018-02-07 ENCOUNTER — RESULT REVIEW (OUTPATIENT)
Age: 68
End: 2018-02-07

## 2018-02-07 DIAGNOSIS — N65.0 DEFORMITY OF RECONSTRUCTED BREAST: ICD-10-CM

## 2018-02-07 DIAGNOSIS — Z90.89 ACQUIRED ABSENCE OF OTHER ORGANS: Chronic | ICD-10-CM

## 2018-02-07 DIAGNOSIS — Z85.3 PERSONAL HISTORY OF MALIGNANT NEOPLASM OF BREAST: ICD-10-CM

## 2018-02-07 DIAGNOSIS — Z98.891 HISTORY OF UTERINE SCAR FROM PREVIOUS SURGERY: Chronic | ICD-10-CM

## 2018-02-07 DIAGNOSIS — Z90.13 ACQUIRED ABSENCE OF BILATERAL BREASTS AND NIPPLES: ICD-10-CM

## 2018-02-07 DIAGNOSIS — Z90.710 ACQUIRED ABSENCE OF BOTH CERVIX AND UTERUS: Chronic | ICD-10-CM

## 2018-02-07 PROCEDURE — 88304 TISSUE EXAM BY PATHOLOGIST: CPT | Mod: 26

## 2018-02-08 PROCEDURE — 19380 REVJ RECONSTRUCTED BREAST: CPT | Mod: 50

## 2018-02-08 PROCEDURE — 13101 CMPLX RPR TRUNK 2.6-7.5 CM: CPT | Mod: XS

## 2018-02-08 PROCEDURE — 15200 FTH/GFT FR TRNK 20 SQ CM/<: CPT

## 2018-02-08 PROCEDURE — 13102 CMPLX RPR TRUNK ADDL 5CM/<: CPT | Mod: XS

## 2018-02-08 PROCEDURE — 88304 TISSUE EXAM BY PATHOLOGIST: CPT

## 2020-01-03 PROBLEM — Z85.3 PERSONAL HISTORY OF MALIGNANT NEOPLASM OF BREAST: Chronic | Status: ACTIVE | Noted: 2017-10-13

## 2020-01-03 PROBLEM — K76.0 FATTY (CHANGE OF) LIVER, NOT ELSEWHERE CLASSIFIED: Chronic | Status: ACTIVE | Noted: 2017-10-13

## 2020-01-03 PROBLEM — Z86.79 PERSONAL HISTORY OF OTHER DISEASES OF THE CIRCULATORY SYSTEM: Chronic | Status: ACTIVE | Noted: 2017-10-13

## 2020-01-03 PROBLEM — E03.9 HYPOTHYROIDISM, UNSPECIFIED: Chronic | Status: ACTIVE | Noted: 2017-10-13

## 2020-01-03 PROBLEM — Z87.39 PERSONAL HISTORY OF OTHER DISEASES OF THE MUSCULOSKELETAL SYSTEM AND CONNECTIVE TISSUE: Chronic | Status: ACTIVE | Noted: 2017-10-13

## 2020-01-03 PROBLEM — I82.409 ACUTE EMBOLISM AND THROMBOSIS OF UNSPECIFIED DEEP VEINS OF UNSPECIFIED LOWER EXTREMITY: Chronic | Status: ACTIVE | Noted: 2017-10-13

## 2020-01-03 PROBLEM — G47.33 OBSTRUCTIVE SLEEP APNEA (ADULT) (PEDIATRIC): Chronic | Status: ACTIVE | Noted: 2017-10-13

## 2020-01-10 ENCOUNTER — APPOINTMENT (OUTPATIENT)
Dept: ENDOCRINOLOGY | Facility: CLINIC | Age: 70
End: 2020-01-10
Payer: MEDICARE

## 2020-01-10 ENCOUNTER — TRANSCRIPTION ENCOUNTER (OUTPATIENT)
Age: 70
End: 2020-01-10

## 2020-01-10 VITALS
WEIGHT: 156 LBS | HEART RATE: 61 BPM | SYSTOLIC BLOOD PRESSURE: 128 MMHG | HEIGHT: 61 IN | BODY MASS INDEX: 29.45 KG/M2 | DIASTOLIC BLOOD PRESSURE: 70 MMHG

## 2020-01-10 DIAGNOSIS — I10 ESSENTIAL (PRIMARY) HYPERTENSION: ICD-10-CM

## 2020-01-10 PROCEDURE — 99205 OFFICE O/P NEW HI 60 MIN: CPT

## 2020-01-10 RX ORDER — LEVOTHYROXINE SODIUM 0.05 MG/1
50 TABLET ORAL
Qty: 90 | Refills: 0 | Status: DISCONTINUED | COMMUNITY
Start: 2017-03-19 | End: 2020-01-10

## 2020-01-10 RX ORDER — LOSARTAN POTASSIUM 50 MG/1
50 TABLET, FILM COATED ORAL
Qty: 180 | Refills: 0 | Status: DISCONTINUED | COMMUNITY
Start: 2017-03-19 | End: 2020-01-10

## 2020-01-10 RX ORDER — HYDROCHLOROTHIAZIDE 12.5 MG/1
12.5 CAPSULE ORAL
Qty: 90 | Refills: 0 | Status: DISCONTINUED | COMMUNITY
Start: 2017-03-19 | End: 2020-01-10

## 2020-01-11 ENCOUNTER — CLINICAL ADVICE (OUTPATIENT)
Age: 70
End: 2020-01-11

## 2020-01-16 ENCOUNTER — APPOINTMENT (OUTPATIENT)
Dept: ENDOCRINOLOGY | Facility: CLINIC | Age: 70
End: 2020-01-16
Payer: MEDICARE

## 2020-01-16 PROCEDURE — 97802 MEDICAL NUTRITION INDIV IN: CPT

## 2020-01-20 ENCOUNTER — MOBILE ON CALL (OUTPATIENT)
Age: 70
End: 2020-01-20

## 2020-01-30 ENCOUNTER — APPOINTMENT (OUTPATIENT)
Dept: ENDOCRINOLOGY | Facility: CLINIC | Age: 70
End: 2020-01-30
Payer: MEDICARE

## 2020-01-30 VITALS
DIASTOLIC BLOOD PRESSURE: 70 MMHG | BODY MASS INDEX: 29.64 KG/M2 | WEIGHT: 157 LBS | SYSTOLIC BLOOD PRESSURE: 132 MMHG | HEART RATE: 66 BPM | HEIGHT: 61 IN

## 2020-01-30 DIAGNOSIS — Z85.3 PERSONAL HISTORY OF MALIGNANT NEOPLASM OF BREAST: ICD-10-CM

## 2020-01-30 DIAGNOSIS — D80.2 SELECTIVE DEFICIENCY OF IMMUNOGLOBULIN A [IGA]: ICD-10-CM

## 2020-01-30 LAB
GLUCOSE BLDC GLUCOMTR-MCNC: 129
HBA1C MFR BLD HPLC: 10.7
MICROALBUMIN/CREAT 24H UR-RTO: 23

## 2020-01-30 PROCEDURE — G0108 DIAB MANAGE TRN  PER INDIV: CPT

## 2020-01-30 PROCEDURE — 95249 CONT GLUC MNTR PT PROV EQP: CPT

## 2020-01-30 PROCEDURE — 82962 GLUCOSE BLOOD TEST: CPT

## 2020-01-30 PROCEDURE — 99214 OFFICE O/P EST MOD 30 MIN: CPT | Mod: 25

## 2020-01-30 NOTE — CONSULT LETTER
[Dear  ___] : Dear  [unfilled], [Courtesy Letter:] : I had the pleasure of seeing your patient, [unfilled], in my office today. [Please see my note below.] : Please see my note below. [Consult Closing:] : Thank you very much for allowing me to participate in the care of this patient.  If you have any questions, please do not hesitate to contact me. [Sincerely,] : Sincerely, [DrMacy  ___] : Dr. JOHN [FreeTextEntry3] : Camilo Stearns MD, FACE\par

## 2020-01-30 NOTE — PHYSICAL EXAM
[No Acute Distress] : no acute distress [Healthy Appearance] : healthy appearance [No LAD] : no lymphadenopathy [No Thyroid Nodules] : there were no palpable thyroid nodules [Normal Rate and Effort] : normal respiratory rhythm and effort [Clear to Auscultation] : lungs were clear to auscultation bilaterally [Normal Rate] : heart rate was normal  [Normal S1, S2] : normal S1 and S2 [Regular Rhythm] : with a regular rhythm [Murmurs] : no murmurs [No Edema] : there was no peripheral edema [Acanthosis Nigricans] : no acanthosis nigricans [Normal Insight/Judgement] : insight and judgment were intact [Normal Affect] : the affect was normal [Normal Mood] : the mood was normal [de-identified] : Thyroid is firm

## 2020-01-30 NOTE — HISTORY OF PRESENT ILLNESS
[FreeTextEntry1] : Here for follow up of DM and hypothyroidism.   Previously seeing Dr. Hutchinson and now transitioning care to the [facility].  Recent labs were done showing a substantially elevated BRYSON-65 level c/w type 1 DM.  She does have a preexisting history of primary hypothyroidism (likely Hashimoto's).  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 Wakefield and has a history of breast cancer.     \par \par Quality:  Type 1 DM  (+ BRYSON -65), initially thought to be type 2 and started on metformin.  \par Severity:  uncontrolled. \par Duration of diabetes:  diagnosed Jan 2020\par Onset:  found on routine labs, occurred in setting of dexamethasone use given with IVIG infusions for IgA deficiency\par Associated Complications/ Symptoms:  No known microvascular complications\par Modifying Factors:  Better with medication\par \par SMBG:  testing 3 times a day with most values under 160 mg/dl lately.  \par \par Current Diabetic Medication Regimen:\par Metformin  mg in AM and 1000 mg in PM\par

## 2020-01-30 NOTE — REVIEW OF SYSTEMS
[Recent Weight Loss (___ Lbs)] : recent [unfilled] ~Ulb weight loss [Chest Pain] : no chest pain [Shortness Of Breath] : no shortness of breath [Nausea] : no nausea [Pain/Numbness of Digits] : no pain/numbness of digits

## 2020-01-30 NOTE — DATA REVIEWED
[FreeTextEntry1] : LABS 1/11/2019:\par AST  175,  ALT  130\par Creatinine 0.69\par C-peptide 2.82\par BRYSON-65 antibody:  118 \par A1c 10.7%

## 2020-01-30 NOTE — ASSESSMENT
[FreeTextEntry1] : 69 year old female with recently diagnosed DM, which now appears to be Type 1 based on elevated BRYSON-65 antibody.  She also has underlying hypothyroidism.  IgA deficiency has been associated with such concomitant autoimmune diseases.    She also has hyperlipidemia and elevated LFTs due to steatohepatitis.  \par \par 1.  Type 1 DM-  she still has endogenous insulin production based on detectable C-peptide.  Will initiate  therapy with basal insulin.  Explained that insulin therapy is needed to reduce the risk of DKA.  Will start with 10 units qhs and titrate based on BG data.  Will D/C metformin as this is contraindicated in settings of hepatic dysfunction and is unlikely to be helpful in managing type 1 DM.  Will eventually need the addition of mealtime insulin.   Patient was provided with a sample Freestyle Dank continuous glucose monitor.  \par 2.  hypothyroidism-  continue LT4\par 3.  Hyperlipidemia-  was on repatha in the past.  Unable to take statin due to elevated LFTs.  Could consider the use of Zetia based on results of next lipid panel.  \par

## 2020-02-13 ENCOUNTER — APPOINTMENT (OUTPATIENT)
Dept: ENDOCRINOLOGY | Facility: CLINIC | Age: 70
End: 2020-02-13
Payer: MEDICARE

## 2020-02-13 PROCEDURE — 97803 MED NUTRITION INDIV SUBSEQ: CPT

## 2020-03-31 RX ORDER — INSULIN GLARGINE 100 [IU]/ML
100 INJECTION, SOLUTION SUBCUTANEOUS
Qty: 1 | Refills: 1 | Status: DISCONTINUED | COMMUNITY
Start: 2020-01-30 | End: 2020-03-31

## 2020-04-03 ENCOUNTER — APPOINTMENT (OUTPATIENT)
Dept: ENDOCRINOLOGY | Facility: CLINIC | Age: 70
End: 2020-04-03

## 2020-04-18 ENCOUNTER — RX RENEWAL (OUTPATIENT)
Age: 70
End: 2020-04-18

## 2020-04-20 ENCOUNTER — RX RENEWAL (OUTPATIENT)
Age: 70
End: 2020-04-20

## 2020-04-21 RX ORDER — FLASH GLUCOSE SENSOR
KIT MISCELLANEOUS
Qty: 2 | Refills: 2 | Status: DISCONTINUED | COMMUNITY
Start: 2020-01-30 | End: 2020-04-21

## 2020-04-21 RX ORDER — FLASH GLUCOSE SENSOR
KIT MISCELLANEOUS
Qty: 2 | Refills: 2 | Status: DISCONTINUED | COMMUNITY
Start: 2020-01-31 | End: 2020-04-21

## 2020-04-24 ENCOUNTER — APPOINTMENT (OUTPATIENT)
Dept: ENDOCRINOLOGY | Facility: CLINIC | Age: 70
End: 2020-04-24
Payer: MEDICARE

## 2020-04-24 PROCEDURE — 95251 CONT GLUC MNTR ANALYSIS I&R: CPT

## 2020-04-24 PROCEDURE — 99443: CPT | Mod: CR

## 2020-04-24 RX ORDER — INSULIN GLARGINE 100 [IU]/ML
100 INJECTION, SOLUTION SUBCUTANEOUS
Qty: 1 | Refills: 0 | Status: DISCONTINUED | COMMUNITY
Start: 2020-01-30 | End: 2020-04-24

## 2020-04-24 RX ORDER — PEN NEEDLE, DIABETIC 32 GX 1/4"
32G X 6 MM NEEDLE, DISPOSABLE MISCELLANEOUS
Qty: 100 | Refills: 1 | Status: DISCONTINUED | COMMUNITY
Start: 2020-01-30 | End: 2020-04-24

## 2020-04-24 RX ORDER — METFORMIN ER 500 MG 500 MG/1
500 TABLET ORAL DAILY
Qty: 360 | Refills: 1 | Status: DISCONTINUED | COMMUNITY
Start: 2020-01-10 | End: 2020-04-24

## 2020-05-01 ENCOUNTER — APPOINTMENT (OUTPATIENT)
Dept: ENDOCRINOLOGY | Facility: CLINIC | Age: 70
End: 2020-05-01
Payer: MEDICARE

## 2020-05-01 PROCEDURE — ZZZZZ: CPT

## 2020-05-01 PROCEDURE — 95249 CONT GLUC MNTR PT PROV EQP: CPT

## 2020-05-12 ENCOUNTER — APPOINTMENT (OUTPATIENT)
Dept: ENDOCRINOLOGY | Facility: CLINIC | Age: 70
End: 2020-05-12

## 2020-07-06 ENCOUNTER — RX RENEWAL (OUTPATIENT)
Age: 70
End: 2020-07-06

## 2020-07-07 ENCOUNTER — NON-APPOINTMENT (OUTPATIENT)
Age: 70
End: 2020-07-07

## 2020-07-10 ENCOUNTER — NON-APPOINTMENT (OUTPATIENT)
Age: 70
End: 2020-07-10

## 2020-09-03 ENCOUNTER — RESULT CHARGE (OUTPATIENT)
Age: 70
End: 2020-09-03

## 2020-09-04 ENCOUNTER — APPOINTMENT (OUTPATIENT)
Dept: ENDOCRINOLOGY | Facility: CLINIC | Age: 70
End: 2020-09-04
Payer: MEDICARE

## 2020-09-04 VITALS
BODY MASS INDEX: 31.72 KG/M2 | SYSTOLIC BLOOD PRESSURE: 130 MMHG | DIASTOLIC BLOOD PRESSURE: 74 MMHG | WEIGHT: 168 LBS | HEIGHT: 61 IN | HEART RATE: 61 BPM

## 2020-09-04 LAB — GLUCOSE BLDC GLUCOMTR-MCNC: 168

## 2020-09-04 PROCEDURE — 99214 OFFICE O/P EST MOD 30 MIN: CPT | Mod: 25

## 2020-09-04 PROCEDURE — 95251 CONT GLUC MNTR ANALYSIS I&R: CPT

## 2020-09-04 PROCEDURE — 82962 GLUCOSE BLOOD TEST: CPT

## 2020-09-04 RX ORDER — OMEPRAZOLE 20 MG/1
20 CAPSULE, DELAYED RELEASE ORAL
Qty: 180 | Refills: 0 | Status: DISCONTINUED | COMMUNITY
Start: 2017-04-01 | End: 2020-09-04

## 2020-09-04 NOTE — PHYSICAL EXAM
[Healthy Appearance] : healthy appearance [Normal Sclera/Conjunctiva] : normal sclera/conjunctiva [No Acute Distress] : no acute distress [No Proptosis] : no proptosis [No LAD] : no lymphadenopathy [No Neck Mass] : no neck mass was observed [Thyroid Not Enlarged] : the thyroid was not enlarged [Supple] : the neck was supple [No Thyroid Nodules] : no palpable thyroid nodules [Clear to Auscultation] : lungs were clear to auscultation bilaterally [No Respiratory Distress] : no respiratory distress [No Murmurs] : no murmurs [Normal S1, S2] : normal S1 and S2 [Normal Rate] : heart rate was normal [Normal Affect] : the affect was normal [Regular Rhythm] : with a regular rhythm [Normal Insight/Judgement] : insight and judgment were intact [Normal Mood] : the mood was normal [Acanthosis Nigricans] : no acanthosis nigricans

## 2020-09-04 NOTE — REVIEW OF SYSTEMS
[Recent Weight Gain (___ Lbs)] : recent weight gain: [unfilled] lbs [Chest Pain] : no chest pain [Shortness Of Breath] : no shortness of breath

## 2020-09-04 NOTE — HISTORY OF PRESENT ILLNESS
[FreeTextEntry1] : Here for follow up of DM and hypothyroidism.  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 Wakefield and has a history of breast cancer.     \par \par Quality:  Type 1 DM  (+ BRYSON -65), initially thought to be type 2 and started on metformin.  \par Severity:  uncontrolled. \par Duration of diabetes:  diagnosed Jan 2020\par Onset:  found on routine labs, occurred in setting of dexamethasone use given with IVIG infusions for IgA deficiency\par Associated Complications/ Symptoms:  No known microvascular complications\par Modifying Factors:  Better with insulin\par \par SMBG:  Using Dexcom G6 CGM (prior to CGM, was testing 4 times a day).  \par Reviewed Dexcom and average BG is 150 mg/dl with SD of 36.  81% of values are within goal range, 19% above range and 0% below range.  \par \par Current Diabetic Medication Regimen:\par  Basaglar 9 units qhs\par Novolog with meals\par < 150:  0 units\par 150-200: 2 units\par 201-250: 3 units\par 251 +: 4 units

## 2020-09-04 NOTE — ASSESSMENT
[FreeTextEntry1] : 69 year old female with recently diagnosed DM, which now appears to be Type 1 based on elevated BRYSON-65 antibody. She also has underlying hypothyroidism and dyslipidemia.  Her glycemic control has improved with basal bolus insulin therapy.  \par \par 1. Type 1 DM-   Start dosing Novolog using IC ratio of 1:15 and suggested using ISF of 1:50.  \par 2. hypothyroidism- continue LT4\par 3. Hyperlipidemia- was on repatha in the past. Unable to take statin due to elevated LFTs. Consider adding Zetia in future.  \par

## 2020-09-04 NOTE — DATA REVIEWED
[FreeTextEntry1] : LABS \par 8/31/2020\par A1c  6.4%\par TSH  1.4\par ALT 90\par AST  86\par \par 1/11/2019:\par AST  175,  ALT  130\par Creatinine 0.69\par C-peptide 2.82\par BRYSON-65 antibody:  118 \par A1c 10.7%

## 2020-09-08 ENCOUNTER — RX RENEWAL (OUTPATIENT)
Age: 70
End: 2020-09-08

## 2020-09-18 ENCOUNTER — RX RENEWAL (OUTPATIENT)
Age: 70
End: 2020-09-18

## 2020-10-29 ENCOUNTER — NON-APPOINTMENT (OUTPATIENT)
Age: 70
End: 2020-10-29

## 2020-11-30 ENCOUNTER — RX RENEWAL (OUTPATIENT)
Age: 70
End: 2020-11-30

## 2021-01-08 ENCOUNTER — APPOINTMENT (OUTPATIENT)
Dept: ENDOCRINOLOGY | Facility: CLINIC | Age: 71
End: 2021-01-08
Payer: MEDICARE

## 2021-01-08 VITALS
DIASTOLIC BLOOD PRESSURE: 70 MMHG | SYSTOLIC BLOOD PRESSURE: 130 MMHG | OXYGEN SATURATION: 97 % | RESPIRATION RATE: 16 BRPM | TEMPERATURE: 98 F | HEIGHT: 61 IN | HEART RATE: 60 BPM

## 2021-01-08 LAB — GLUCOSE BLDC GLUCOMTR-MCNC: 219

## 2021-01-08 PROCEDURE — 82962 GLUCOSE BLOOD TEST: CPT

## 2021-01-08 PROCEDURE — 95251 CONT GLUC MNTR ANALYSIS I&R: CPT

## 2021-01-08 PROCEDURE — 99214 OFFICE O/P EST MOD 30 MIN: CPT | Mod: 25

## 2021-01-11 ENCOUNTER — TRANSCRIPTION ENCOUNTER (OUTPATIENT)
Age: 71
End: 2021-01-11

## 2021-01-20 NOTE — HISTORY OF PRESENT ILLNESS
[FreeTextEntry1] : Here for follow up of DM and hypothyroidism.  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 Wakefield and has a history of breast cancer.     \par \par Quality:  Type 1 DM  (+ BRYSON -65), initially thought to be type 2 and started on metformin.  \par Severity:  uncontrolled. \par Duration of diabetes:  diagnosed Jan 2020\par Onset:  found on routine labs, occurred in setting of dexamethasone use given with IVIG infusions for IgA deficiency\par Associated Complications/ Symptoms:  No known microvascular complications\par Modifying Factors:  Better with insulin\par \par SMBG:  Using Dexcom G6 CGM (prior to CGM, was testing 4 times a day).  \par Reviewed Dexcom and average BG is 160 mg/dl with SD of 40.  74% of values are within goal range, 26% above range and 0% below range.  \par \par Current Diabetic Medication Regimen:\par Basaglar 9 units qhs\par Novolog with meals IC ratio of 10 and ISF 1:50 (has not been consistently taking this)

## 2021-01-20 NOTE — ASSESSMENT
[FreeTextEntry1] : 70 year old female with Type 1 DM, hypothyroidism and  hyperlipidemia.  Her glycemic control has worsened slightly due to Postprandial hyperglycemia.\par \par 1.  Type 1 DM-  follow up on labs done yesterday.   Do not skip Novolog with meals, even for low CHO meals to prevent PP hyperglycemia.  Use IC ratio of 1:10. \par 2.  hypothyroidism-  continue LT4\par 3.  Hyperlipidemia-    Unable to take statin due to elevated LFTs.  Consider Zetia based on review of Lipids.  \par

## 2021-01-20 NOTE — PHYSICAL EXAM
[Healthy Appearance] : healthy appearance [No Acute Distress] : no acute distress [Normal Sclera/Conjunctiva] : normal sclera/conjunctiva [No Proptosis] : no proptosis [No Neck Mass] : no neck mass was observed [No LAD] : no lymphadenopathy [Supple] : the neck was supple [Thyroid Not Enlarged] : the thyroid was not enlarged [No Thyroid Nodules] : no palpable thyroid nodules [No Respiratory Distress] : no respiratory distress [Clear to Auscultation] : lungs were clear to auscultation bilaterally [Normal S1, S2] : normal S1 and S2 [No Murmurs] : no murmurs [Normal Rate] : heart rate was normal [Regular Rhythm] : with a regular rhythm [Normal Affect] : the affect was normal [Normal Insight/Judgement] : insight and judgment were intact [Normal Mood] : the mood was normal [Acanthosis Nigricans] : no acanthosis nigricans

## 2021-01-20 NOTE — ADDENDUM
[FreeTextEntry1] : CGM medical necessity statement:\par Patient tests her blood glucose 4 or more times a day and injects insulin 3 or more times per day.\par She has been seen regularly at this office within the past 6 months.\par She requires frequent adjustments to her insulin regimen on the basis of therapeutic CGM results using correction factor of 1:50.\par

## 2021-04-15 LAB
HBA1C MFR BLD HPLC: 6.5
LDLC SERPL DIRECT ASSAY-MCNC: 125
MICROALBUMIN/CREAT 24H UR-RTO: 30

## 2021-04-16 ENCOUNTER — APPOINTMENT (OUTPATIENT)
Dept: ENDOCRINOLOGY | Facility: CLINIC | Age: 71
End: 2021-04-16
Payer: MEDICARE

## 2021-04-16 PROCEDURE — 99214 OFFICE O/P EST MOD 30 MIN: CPT | Mod: 95

## 2021-04-16 RX ORDER — PEN NEEDLE, DIABETIC 29 G X1/2"
32G X 4 MM NEEDLE, DISPOSABLE MISCELLANEOUS
Qty: 400 | Refills: 1 | Status: DISCONTINUED | COMMUNITY
Start: 2020-06-09 | End: 2021-04-16

## 2021-04-16 RX ORDER — BLOOD SUGAR DIAGNOSTIC
STRIP MISCELLANEOUS 3 TIMES DAILY
Qty: 300 | Refills: 1 | Status: DISCONTINUED | COMMUNITY
End: 2021-04-16

## 2021-04-21 NOTE — ADDENDUM
[FreeTextEntry1] : CGM medical necessity statement:\par Patient tests her blood glucose 4 or more times a day and injects insulin 3 or more times per day.\par She has been seen regularly at this office within the past 6 months.\par She requires frequent adjustments to her insulin regimen on the basis of CGM results using a correction factor of 1:50.\par

## 2021-04-21 NOTE — HISTORY OF PRESENT ILLNESS
[Home] : at home, [unfilled] , at the time of the visit. [Medical Office: ([facility])___] : at the medical office located in  [Verbal consent obtained from patient] : the patient, [unfilled] [FreeTextEntry1] : Telehealth appointment conducted as patient was exposed to COVID-19\par Time started: 10:36 AM\par Time Ended: 11:00 AM\par \par Follow up of DM and hypothyroidism.  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 Wakefield and has a history of breast cancer.     \par \par Quality:  Type 1 DM  (+ BRYSON -65), initially thought to be type 2 and started on metformin.  \par Severity:  uncontrolled. \par Duration of diabetes:  diagnosed Jan 2020\par Onset:  found on routine labs, occurred in setting of dexamethasone use given with IVIG infusions for IgA deficiency\par Associated Complications/ Symptoms:  No known microvascular complications\par Modifying Factors:  Better with insulin\par \par SMBG:  Using Dexcom G6 CGM (prior to CGM, was testing 4 times a day).  \par Reviewed Dexcom and average BG is 147 mg/dl with CV of 23.  84% of values are within goal range, 16% above range and 0% below range.  \par \par Current Diabetic Medication Regimen:\par Basaglar 9 units qhs\par Novolog with meals IC ratio of 10 and ISF 1:50 (has not been consistently taking this)\par \par Had rapid COVID test yesterday, which was negative.  \par Has lost 4 pounds through diet.   Has been increasing exercise.

## 2021-05-24 ENCOUNTER — NON-APPOINTMENT (OUTPATIENT)
Age: 71
End: 2021-05-24

## 2021-06-10 ENCOUNTER — NON-APPOINTMENT (OUTPATIENT)
Age: 71
End: 2021-06-10

## 2021-07-22 NOTE — DISCHARGE NOTE ADULT - NS AS DC STROKE DX YN
Patient returns to the office for evaluation of   Chief Complaint   Patient presents with    Finger Injury     left middle finger open fracture distal phalanx; nail bed laceration   The patient has had no significant problems and voices no complaints. The patient's social history, past medical history, family history, medications, allergies and review of systems have been reviewed, dated 7/15/21 and are recorded in the chart. The lacration is healing cleanly, without sign of infection. The sutures are removed. Finger range of motion is normal, with less swelling. AP and lateral Xrays of the left middle finger done in the office today, demonstrate progressive healing of the fracture in satisfactory position. The patient is instructed about skin care and activities. He is warned not to do hot soaks or ice applications, neither of which are indicated and could cause tissue damage. The usual course of events in the resolution of the symptoms associated with this condition is fully discussed with the patient. As long as they progress as expected, they do not need to return for further follow up. They are, however, urged to call or return if they have questions or concerns.
no

## 2021-08-18 LAB
HBA1C MFR BLD HPLC: 6.2
LDLC SERPL DIRECT ASSAY-MCNC: 113
MICROALBUMIN/CREAT 24H UR-RTO: 12

## 2021-08-20 ENCOUNTER — APPOINTMENT (OUTPATIENT)
Dept: ENDOCRINOLOGY | Facility: CLINIC | Age: 71
End: 2021-08-20
Payer: MEDICARE

## 2021-08-20 VITALS
SYSTOLIC BLOOD PRESSURE: 130 MMHG | HEIGHT: 61 IN | WEIGHT: 167.25 LBS | DIASTOLIC BLOOD PRESSURE: 70 MMHG | TEMPERATURE: 98.4 F | BODY MASS INDEX: 31.58 KG/M2 | HEART RATE: 61 BPM | OXYGEN SATURATION: 97 % | RESPIRATION RATE: 14 BRPM

## 2021-08-20 LAB — GLUCOSE BLDC GLUCOMTR-MCNC: 166

## 2021-08-20 PROCEDURE — 82962 GLUCOSE BLOOD TEST: CPT

## 2021-08-20 PROCEDURE — 95251 CONT GLUC MNTR ANALYSIS I&R: CPT

## 2021-08-20 PROCEDURE — 99214 OFFICE O/P EST MOD 30 MIN: CPT | Mod: 25

## 2021-08-20 RX ORDER — DIAZEPAM 2 MG/1
2 TABLET ORAL
Qty: 4 | Refills: 0 | Status: DISCONTINUED | COMMUNITY
Start: 2017-08-16 | End: 2021-08-20

## 2021-08-20 NOTE — ASSESSMENT
[FreeTextEntry1] : 70 year old female with Type 1 DM, hypothyroidism and  hyperlipidemia.  Her diabetes is well controlled.  She is likely in prolonged honeymoon phase as evidenced by her very low insulin requirements.  \par \par 1.  Type 1 DM-   Continue low dose basal insulin.  Explained that we must not discontinue this due to the associated risks of DKA in JOSE LUIS and Type 1 DM.  \par 2.  hypothyroidism-  Euthyroid, continue LT4\par 3.  Hyperlipidemia-    Unable to take statin due to elevated LFTs.   Continue to monitor and improve diet.   \par

## 2021-08-20 NOTE — HISTORY OF PRESENT ILLNESS
[FreeTextEntry1] : Follow up of DM and hypothyroidism.  \par History of IgA deficiency.  Was being treated with IVIG infusion (Dexamethasone with infusions), but currently not being treated.  \par \par Quality:  Type 1 DM  (+ BRYSON -65), initially thought to be type 2 and started on metformin.  \par Severity:  uncontrolled. \par Duration of diabetes:  diagnosed Jan 2020\par Onset:  found on routine labs, occurred in setting of dexamethasone use given with IVIG infusions for IgA deficiency\par Associated Complications/ Symptoms:  No known microvascular complications\par Modifying Factors:  Better with insulin\par \par SMBG:  Using Dexcom G6 CGM (prior to CGM, was testing 4 times a day).  \par Reviewed Dexcom and average BG is 129 mg/dl with Standard deviation of 28.  94% of values are within goal range, 6% above range and 0% below range.  \par \par Current Diabetic Medication Regimen:\par Basaglar 5 units qhs\par Not requiring any prandial Novolog lately since she has come off infusions-  typically uses ICR 1:10 with meals.  \par \par

## 2021-08-20 NOTE — PHYSICAL EXAM
[Healthy Appearance] : healthy appearance [No Acute Distress] : no acute distress [Normal Sclera/Conjunctiva] : normal sclera/conjunctiva [No Proptosis] : no proptosis [No Respiratory Distress] : no respiratory distress [Clear to Auscultation] : lungs were clear to auscultation bilaterally [Normal S1, S2] : normal S1 and S2 [No Murmurs] : no murmurs [Normal Rate] : heart rate was normal [Regular Rhythm] : with a regular rhythm [No Edema] : no peripheral edema [Right Foot Was Examined] : right foot ~C was examined [Left Foot Was Examined] : left foot ~C was examined [Normal] : normal [2+] : 2+ in the dorsalis pedis [Normal Affect] : the affect was normal [Normal Insight/Judgement] : insight and judgment were intact [Normal Mood] : the mood was normal [Acanthosis Nigricans] : no acanthosis nigricans [Diminished Throughout Both Feet] : normal tactile sensation with monofilament testing throughout both feet [FreeTextEntry1] : Callus [FreeTextEntry5] : Callus

## 2021-12-03 ENCOUNTER — APPOINTMENT (OUTPATIENT)
Dept: ENDOCRINOLOGY | Facility: CLINIC | Age: 71
End: 2021-12-03

## 2021-12-09 RX ORDER — INSULIN GLARGINE 100 [IU]/ML
100 INJECTION, SOLUTION SUBCUTANEOUS
Qty: 15 | Refills: 1 | Status: DISCONTINUED | COMMUNITY
Start: 2020-01-31 | End: 2021-12-09

## 2021-12-10 ENCOUNTER — APPOINTMENT (OUTPATIENT)
Dept: ENDOCRINOLOGY | Facility: CLINIC | Age: 71
End: 2021-12-10
Payer: MEDICARE

## 2021-12-10 VITALS
HEIGHT: 61 IN | BODY MASS INDEX: 32.14 KG/M2 | WEIGHT: 170.25 LBS | TEMPERATURE: 97.8 F | DIASTOLIC BLOOD PRESSURE: 60 MMHG | RESPIRATION RATE: 14 BRPM | SYSTOLIC BLOOD PRESSURE: 130 MMHG | HEART RATE: 69 BPM | OXYGEN SATURATION: 97 %

## 2021-12-10 LAB — GLUCOSE BLDC GLUCOMTR-MCNC: 162

## 2021-12-10 PROCEDURE — 82962 GLUCOSE BLOOD TEST: CPT

## 2021-12-10 PROCEDURE — 95251 CONT GLUC MNTR ANALYSIS I&R: CPT

## 2021-12-10 PROCEDURE — 99214 OFFICE O/P EST MOD 30 MIN: CPT | Mod: 25

## 2021-12-10 NOTE — HISTORY OF PRESENT ILLNESS
[FreeTextEntry1] : Follow up of DM and hypothyroidism.  \par History of IgA deficiency. Being treated with IVIG infusion (no longer getting Dexamethasone with infusions)\par \par Quality:  Type 1 DM  (+ BRYSON -65), initially thought to be type 2 and started on metformin.  \par Severity:  uncontrolled. \par Duration of diabetes:  diagnosed Jan 2020\par Onset:  found on routine labs, occurred in setting of dexamethasone use given with IVIG infusions for IgA deficiency\par Associated Complications/ Symptoms:  No known microvascular complications\par Modifying Factors:  Better with insulin\par \par SMBG:  Using Dexcom G6 CGM (prior to CGM, was testing 4 times a day).  \par Reviewed Dexcom and average BG is 161 mg/dl with Standard deviation of 39.  72% of values are within goal range,26% above range and 2% below range.  Having a lot of hyperglycemia after breakfast.   \par \par Current Diabetic Medication Regimen:\par Basaglar 5 units qhs-  wants to change to Tresiba for lower copay. \par Novolog  with meals using  ICR 1:10, but not taking consistently.\par \par

## 2021-12-10 NOTE — PHYSICAL EXAM
[Healthy Appearance] : healthy appearance [No Acute Distress] : no acute distress [Normal Sclera/Conjunctiva] : normal sclera/conjunctiva [No Proptosis] : no proptosis [No Neck Mass] : no neck mass was observed [No LAD] : no lymphadenopathy [Supple] : the neck was supple [Thyroid Not Enlarged] : the thyroid was not enlarged [No Thyroid Nodules] : no palpable thyroid nodules [No Respiratory Distress] : no respiratory distress [Clear to Auscultation] : lungs were clear to auscultation bilaterally [Normal S1, S2] : normal S1 and S2 [No Murmurs] : no murmurs [Normal Rate] : heart rate was normal [Regular Rhythm] : with a regular rhythm [No Edema] : no peripheral edema [Acanthosis Nigricans] : no acanthosis nigricans [Normal Affect] : the affect was normal [Normal Insight/Judgement] : insight and judgment were intact [Normal Mood] : the mood was normal

## 2021-12-10 NOTE — ASSESSMENT
[FreeTextEntry1] : 70 year old female with Type 1 DM, hypothyroidism and  hyperlipidemia.  Her diabetes is well controlled.  She is likely in prolonged honeymoon phase as evidenced by her very low insulin requirements.  \par \par 1.  Type 1 DM-  Due to hyperglycemia after breakfast recommend taking consistent dose of prandial insulin with breakfast.   Use 1-2 units for 1 slice of bread and 3-4 units for 2 slices of bread. Change Basaglar to Tresiba for lower cost.  Obtain recent labs.    \par 2.  hypothyroidism- continue LT4\par 3.  Hyperlipidemia-    Unable to take statin due to elevated LFTs.    Consider in future as LFTs are improving. \par \par CGM medical necessity statement:\par Patient tests blood glucose 4 or more times a day and injects insulin 3 or more times per day.\par She has been seen regularly at this office within the past 6 months.\par She requires frequent adjustments to her insulin regimen on the basis of therapeutic CGM results using ISF of 1:50. \par \par \par \par

## 2021-12-10 NOTE — DATA REVIEWED
[FreeTextEntry1] : LABS:\par 10/26/2021:\par ALT 68\par AST 59\par \par \par 8/31/2020\par A1c  6.4%\par TSH  1.4\par ALT 90\par AST  86\par \par 1/11/2019:\par AST  175,  ALT  130\par Creatinine 0.69\par C-peptide 2.82\par BRYSON-65 antibody:  118 \par A1c 10.7%

## 2022-04-14 ENCOUNTER — APPOINTMENT (OUTPATIENT)
Dept: ENDOCRINOLOGY | Facility: CLINIC | Age: 72
End: 2022-04-14
Payer: MEDICARE

## 2022-04-14 LAB
HBA1C MFR BLD HPLC: 6.9
LDLC SERPL DIRECT ASSAY-MCNC: 125
MICROALBUMIN/CREAT 24H UR-RTO: 49

## 2022-04-14 PROCEDURE — 99214 OFFICE O/P EST MOD 30 MIN: CPT | Mod: 95

## 2022-04-14 NOTE — HISTORY OF PRESENT ILLNESS
[Home] : at home, [unfilled] , at the time of the visit. [Medical Office: ([facility])___] : at the medical office located in  [Verbal consent obtained from patient] : the patient, [unfilled] [FreeTextEntry1] : Telehealth visit conducted.\par Time started:  8:27AM\par Time ended:  8:47 AM\par \par Follow up of DM and hypothyroidism.  \par History of IgA deficiency. Being treated with IVIG infusion (no longer getting Dexamethasone with infusions)\par \par Quality:  Type 1 DM  (+ BRYSON -65), initially thought to be type 2 and started on metformin.  \par Severity:  uncontrolled. \par Duration of diabetes:  diagnosed Jan 2020\par Onset:  found on routine labs, occurred in setting of dexamethasone use given with IVIG infusions for IgA deficiency\par Associated Complications/ Symptoms:  No known microvascular complications\par Modifying Factors:  Better with insulin\par \par SMBG:  Using Dexcom G6 CGM (prior to CGM, was testing 4 times a day).  \par Reviewed Dexcom and average BG is 154 mg/dl with Standard deviation of 35.  79% of values are within goal range,21% above range and 0% below range.   Still having occasional episodes of mild PP hyperglycemia.    \par \par Current Diabetic Medication Regimen:\par Tresiba 5 units qhs-  wants to change to Tresiba for lower copay. \par Novolog  with meals using  ICR 1:10, but not taking consistently.\par \par Following with GI for fatty liver.  Weight loss was recommended. \par Admits to sometimes skipping Novolog doses.   Reports rise in BG after coffee in morning. \par Reports that she was found to have a thyroid nodule in the past.   \par \par

## 2022-04-14 NOTE — ASSESSMENT
[FreeTextEntry1] : 71 year old female with Type 1 DM, hypothyroidism and  hyperlipidemia.  Her diabetes is well controlled.   \par \par 1.  Type 1 DM-  Recommended taking novolog consistently with all meals and using 1 unit to cover morning coffee.\par 2.  hypothyroidism- continue LT4\par 3.  Hyperlipidemia-    Unable to take statin due to elevated LFTs.    Advised exercise and weight loss.\par 4.  ?thyroid nodule-  check US now.\par 5.  NAFLD-  discussed need for weight loss.  Follow up with GI.   \par \par Follow up in 4 months. \par \par CGM medical necessity statement:\par Patient tests blood glucose 4 or more times a day and injects insulin 3 or more times per day.\par She has been seen regularly at this office within the past 6 months.\par She requires frequent adjustments to her insulin regimen on the basis of therapeutic CGM results using ISF of 1:50. \par \par \par \par

## 2022-04-14 NOTE — DATA REVIEWED
[FreeTextEntry1] : LABS:\par 4/11/2022:\par AST 68\par ALT 80\par TSH 2.14\par \par 10/26/2021:\par ALT 68\par AST 59\par \par \par 8/31/2020\par A1c  6.4%\par TSH  1.4\par ALT 90\par AST  86\par \par 1/11/2019:\par AST  175,  ALT  130\par Creatinine 0.69\par C-peptide 2.82\par BRYSON-65 antibody:  118 \par A1c 10.7%

## 2022-04-20 NOTE — PATIENT PROFILE ADULT. - ARE SIGNIFICANT INDICATORS COMPLETE.
Mr. Blanc is a 44 yo WM here for EGD given intermittent dysphagia/choking episodes and colonoscopy for average risk screening.   No

## 2022-05-12 ENCOUNTER — RX RENEWAL (OUTPATIENT)
Age: 72
End: 2022-05-12

## 2022-06-03 ENCOUNTER — APPOINTMENT (OUTPATIENT)
Dept: ENDOCRINOLOGY | Facility: CLINIC | Age: 72
End: 2022-06-03
Payer: MEDICARE

## 2022-06-03 VITALS
WEIGHT: 170 LBS | RESPIRATION RATE: 14 BRPM | TEMPERATURE: 98.1 F | HEIGHT: 61 IN | OXYGEN SATURATION: 97 % | HEART RATE: 60 BPM | SYSTOLIC BLOOD PRESSURE: 130 MMHG | DIASTOLIC BLOOD PRESSURE: 80 MMHG | BODY MASS INDEX: 32.1 KG/M2

## 2022-06-03 DIAGNOSIS — E66.9 OBESITY, UNSPECIFIED: ICD-10-CM

## 2022-06-03 PROCEDURE — 99214 OFFICE O/P EST MOD 30 MIN: CPT | Mod: 25

## 2022-06-03 PROCEDURE — 95251 CONT GLUC MNTR ANALYSIS I&R: CPT

## 2022-06-03 NOTE — HISTORY OF PRESENT ILLNESS
[FreeTextEntry1] : Follow up of DM and hypothyroidism.  Also found to have a small thyroid nodule.\par PMH of breast cancer s/p mastectomy and has ANA mutation.  \par History of IgA deficiency.  At time of diagnosis of DM, was getting dexamethasone with IVIG infusion.\par Also has NAFLD and seeing GI.   Recently had Fibroscan.   Has been unable to lose significant weight despite aggressive lifestyle modification.   Interested in trying GLP-1 agonist to help with this.  \par \par Quality:  Type 1 DM  (+ BRYSON -65), initially thought to be type 2 and started on metformin.  \par Severity:  uncontrolled. \par Duration of diabetes:  diagnosed Jan 2020\par Onset:  found on routine labs, occurred in setting of dexamethasone use given with IVIG infusions for IgA deficiency\par Associated Complications/ Symptoms:  No known microvascular complications\par Modifying Factors:  Better with insulin\par \par SMBG:  Using Dexcom G6 CGM (prior to CGM, was testing 4 times a day).  \par Reviewed Dexcom and average BG is 152 mg/dl with Standard deviation of 34.  84% of values are within goal range, 16% above range and 0% below range.   Having occasional after breakfast hyperglycemia.      \par \par Current Diabetic Medication Regimen:\par Tresiba 5 units qhs \par Novolog  with meals using  ICR 1:10, but not taking consistently.\par \par Admits to sometimes skipping Novolog doses.   Reports rise in BG after coffee in morning. \par Reports that she was found to have a thyroid nodule in the past.   \par \par

## 2022-06-08 ENCOUNTER — TRANSCRIPTION ENCOUNTER (OUTPATIENT)
Age: 72
End: 2022-06-08

## 2022-08-18 LAB
HBA1C MFR BLD HPLC: 6.8
LDLC SERPL CALC-MCNC: 166
MICROALBUMIN/CREAT 24H UR-RTO: 9

## 2022-08-19 ENCOUNTER — RESULT CHARGE (OUTPATIENT)
Age: 72
End: 2022-08-19

## 2022-08-19 ENCOUNTER — APPOINTMENT (OUTPATIENT)
Dept: ENDOCRINOLOGY | Facility: CLINIC | Age: 72
End: 2022-08-19

## 2022-08-19 VITALS
SYSTOLIC BLOOD PRESSURE: 126 MMHG | WEIGHT: 169 LBS | HEIGHT: 61 IN | OXYGEN SATURATION: 97 % | BODY MASS INDEX: 31.91 KG/M2 | DIASTOLIC BLOOD PRESSURE: 70 MMHG | HEART RATE: 66 BPM

## 2022-08-19 LAB — GLUCOSE BLDC GLUCOMTR-MCNC: 138

## 2022-08-19 PROCEDURE — 95251 CONT GLUC MNTR ANALYSIS I&R: CPT

## 2022-08-19 PROCEDURE — 82962 GLUCOSE BLOOD TEST: CPT

## 2022-08-19 PROCEDURE — 99215 OFFICE O/P EST HI 40 MIN: CPT | Mod: 25

## 2022-08-19 PROCEDURE — 99205 OFFICE O/P NEW HI 60 MIN: CPT | Mod: 25

## 2022-08-19 RX ORDER — TAMOXIFEN CITRATE 20 MG/1
20 TABLET, FILM COATED ORAL
Refills: 0 | Status: DISCONTINUED | COMMUNITY
End: 2022-08-19

## 2022-08-19 RX ORDER — INSULIN ASPART 100 [IU]/ML
100 INJECTION, SOLUTION INTRAVENOUS; SUBCUTANEOUS
Qty: 75 | Refills: 1 | Status: DISCONTINUED | COMMUNITY
Start: 2020-04-24 | End: 2022-08-19

## 2022-08-19 RX ORDER — LIRAGLUTIDE 6 MG/ML
18 INJECTION, SOLUTION SUBCUTANEOUS
Qty: 1 | Refills: 0 | Status: DISCONTINUED | COMMUNITY
Start: 2022-06-03 | End: 2022-08-19

## 2022-08-19 RX ORDER — INSULIN DEGLUDEC INJECTION 100 U/ML
100 INJECTION, SOLUTION SUBCUTANEOUS
Qty: 15 | Refills: 0 | Status: DISCONTINUED | COMMUNITY
Start: 2021-12-09 | End: 2022-08-19

## 2022-08-19 NOTE — ASSESSMENT
[FreeTextEntry1] : 71 year old female with Type 1 DM, hypothyroidism and  hyperlipidemia.  Her diabetes is well controlled.  She is likely in prolonged honeymoon phase as evidenced by her very low insulin requirements.  She also has a thyorid nodule and obesity complicated by NAFLD.     Recent DXA shows severe OP.\par \par 1.  Type 1 DM-     Improve compliance with Humalog before meals, otherwise continue current insulin doses.  \par 2.  hypothyroidism- continue LT4\par 3.  Hyperlipidemia-    Unable to take statin due to elevated LFTs.  Consider Zetia in the future    \par 4.  Thyroid nodule-  Repeat US in one year.  \par 5.  Osteoporosis-  due to severity, will treat with anabolic agent (not a good candidate for Bisphosphonates due to dental issues).  Start Forteo 20 mcg daily X 2 years.   Her risk factors for bone loss include postmenopausal status and tamoxifen use.   Will check PTH and 25(OH)D level now to rule out other secondary causes.  \par     \par CGM medical necessity statement:\par Patient tests blood glucose 4 or more times a day and injects insulin 3 or more times per day.\par She has been seen regularly at this office within the past 6 months.\par She requires frequent adjustments to her insulin regimen on the basis of therapeutic CGM results using ISF of 1:50. \par \par Spent 40 minutes on this encounter with time spent counseling patient, reviewing records and documenting.  \par \par \par

## 2022-08-19 NOTE — CONSULT LETTER
[Dear  ___] : Dear  [unfilled], [Consult Letter:] : I had the pleasure of evaluating your patient, [unfilled]. [Please see my note below.] : Please see my note below. [Consult Closing:] : Thank you very much for allowing me to participate in the care of this patient.  If you have any questions, please do not hesitate to contact me. [Sincerely,] : Sincerely, [FreeTextEntry3] : Camilo Stearns MD, FACE\par Endocrinology, Diabetes and Metabolism\par , [employer] [facility]\par

## 2022-08-19 NOTE — HISTORY OF PRESENT ILLNESS
[FreeTextEntry1] : Follow up of DM and hypothyroidism.  Also found to have a small thyroid nodule.\par PMH of breast cancer s/p mastectomy and has ANA mutation.  \par History of IgA deficiency.  At time of diagnosis of DM, was getting dexamethasone with IVIG infusion.\par Also has NAFLD and seeing GI.   Recently had Fibroscan.   \par Her insurance does not cover GLP-1 agonist for weight loss. \par Recently had DXA done with Dr. Wakefield showing worsening osteoporosis.  \par Does not want to take bisphosphonates.  Has extensive dental history with multiple crowns.  \par \par Quality:  Type 1 DM  (+ BRYSON -65), initially thought to be type 2 and started on metformin.  \par Severity:  uncontrolled. \par Duration of diabetes:  diagnosed Jan 2020\par Onset:  found on routine labs, occurred in setting of dexamethasone use given with IVIG infusions for IgA deficiency\par Associated Complications/ Symptoms:  No known microvascular complications\par Modifying Factors:  Better with insulin\par \par SMBG:  Using Dexcom G6 CGM (prior to CGM, was testing 4 times a day).  \par Reviewed Dexcom and average BG is 153 mg/dl with Standard deviation of 38.  78% of values are within goal range, 21% above range and 1% below range.         \par \par Current Diabetic Medication Regimen:\par Basaglar  5 units qhs \par Humalog  with meals using  ICR 1:10, but not taking consistently.\par \par  \par \par

## 2022-08-19 NOTE — REASON FOR VISIT
[Follow - Up] : a follow-up visit [DM Type 1] : DM Type 1 [Hypothyroidism] : hypothyroidism [Thyroid nodule/ MNG] : thyroid nodule/ MNG

## 2022-08-19 NOTE — DATA REVIEWED
[FreeTextEntry1] : LABS:\par 8/16/2022:\par TSH 2.78\par AST 53\par ALT 77\par \par 4/11/2022:\par AST 68\par ALT 80\par TSH 2.14\par \par 10/26/2021:\par ALT 68\par AST 59\par \par \par 8/31/2020\par A1c  6.4%\par TSH  1.4\par ALT 90\par AST  86\par \par 1/11/2019:\par AST  175,  ALT  130\par Creatinine 0.69\par C-peptide 2.82\par BRYSON-65 antibody:  118 \par A1c 10.7%\par \par Thyroid US:  5/26/2022:\par RUP 0.3 cm cyst\par RUP 0.3 cm rim calcified nodule\par \par DXA 8/8/2022"\par L spine T score -4.5\par Left femoral Neck -2.7\par Left total Hip -1.9

## 2022-09-01 ENCOUNTER — APPOINTMENT (OUTPATIENT)
Dept: RHEUMATOLOGY | Facility: CLINIC | Age: 72
End: 2022-09-01

## 2022-09-01 VITALS
RESPIRATION RATE: 18 BRPM | WEIGHT: 172 LBS | BODY MASS INDEX: 32.47 KG/M2 | HEART RATE: 55 BPM | HEIGHT: 61 IN | SYSTOLIC BLOOD PRESSURE: 159 MMHG | DIASTOLIC BLOOD PRESSURE: 74 MMHG | OXYGEN SATURATION: 99 %

## 2022-09-01 PROCEDURE — 99204 OFFICE O/P NEW MOD 45 MIN: CPT

## 2022-09-01 NOTE — ASSESSMENT
[FreeTextEntry1] : 72 y/o female with T1DM and NAFLD, osteoporosis referred to rheumatology for osteoporosis.\par Pt has Hx of breast CA s/p mastectomy and lymph node excision, hysterectomy, tamoxifen, currently in remission. Pt has developed T1DM, receives IVIG by hem/onc for "no immune system".\par Pt follows with GI for NAFLD.\par Pt did not want to start bisphosphonates due to Hx of extensive dental history with multiple crowns, bridge that needs to be changed. Pt is pending possible tooth removal in the future. Pt was Rx'd Forteo by endocrinology but is unable to pay for it ($8272-5741/month). Pt was ordered Prolia by oncologist but is still >$800 out of pocket.\par Hx of R femur and tibia fracture from 2012 related to injury (90lb dog running into her). No pathological fracture.\par DXA from 8/2022 with lowest T-score L-spine -4.5 (worsening compared to prior DXA scans)\par \par Pt has severe osteoporosis worst in L-spine in setting of Hx of breast CA, hysterectomy, Tamoxifen, aging.\par Based on pt's level of osteoporosis, I agree that bone anabolic agents such as Forteo or Tymlos would be the best option for treatment. However, pt is unable to pay for the medications and newer agents are likely even more expensive. Prolia, which may be next best option, is still not affordable. Bisphosphonates are an option if they are the only medications that are affordable, but is likely not sufficient for pt's level of osteoporosis.\par \par Side effects of bisphosphonates were discussed with the pt in detail including bone pain and flu-like illness x 2-3 days, ONJ, atypical femoral fractures. Avoid if CrCl < 30, malabsorption, plan for invasive oral procedure, pregnancy, breastfeeding. Side effects of denosumab were discussed with the pt in detail including ONJ, risk of rapid bone loss, vertebral fractures, atypical femoral fractures after discontinuation. Avoid if CrCl < 15, plan for invasive oral procedure. Side effects of teriparatide and abaloparatide were discussed with the pt in detail including headache, nausea, arthralgia, orthostasis, flushing, hypercalcemia, hyperuricemia. Avoid if Paget’s disease, increased alkaline phosphatase, skeletal metastasis, myeloma, severe renal dysfunction.\par \par After extensive discussion of the medication options and current situation, I gave my recommendation that pt should try all avenues possible to try to get Forteo or Tymlos at an affordable price, which pt's endocrinologist, hem/onc, and pt herself are working on. Otherwise, Prolia (if much more affordable) would be next preferred, then bisphosphonates (PO or IV), although medically I do believe bone anabolic agents are the best option. Extensively discussed possible side effects of these medications, as pt is afraid of the side effects due to strong family Hx (and personal Hx) of CA with pt's acquaintance having ENT cancer possibly associated with bisphosphonates in the past. I answered pt's questions to best of my abilities. If pt decides on bisphosphonates or Prolia, pt will follow up with her dentist to have her tooth extraction done and have it heal prior to starting either medications. Pt grateful for the discussion and the counseling.\par \par Total of 60 minutes was spent for the face-to-face clinic visit\par \par \par \par \par

## 2022-09-01 NOTE — HISTORY OF PRESENT ILLNESS
[FreeTextEntry1] : 72 y/o female with T1DM and NAFLD, osteoporosis referred to rheumatology for osteoporosis.\par Pt has Hx of breast CA s/p mastectomy and lymph node excision, hysterectomy, tamoxifen, currently in remission. Pt has developed T1DM, receives IVIG by hem/onc for "no immune system".\par Pt follows with GI for NAFLD.\par Pt did not want to start bisphosphonates due to Hx of extensive dental history with multiple crowns, bridge that needs to be changed. Pt is pending possible tooth removal in the future. Pt was Rx'd Forteo by endocrinology but is unable to pay for it ($8855-2121/month). Pt was ordered Prolia by oncologist but is still >$800 out of pocket.\par Hx of R femur and tibia fracture from 2012 related to injury (90lb dog running into her). No pathological fracture.\par \par DXA (8/2022): Lowest T-score L-spine -4.5, L femoral neck -2.7, L total hip -1.9, R femoral neck -2.7, R total hip -2.2

## 2022-09-06 ENCOUNTER — NON-APPOINTMENT (OUTPATIENT)
Age: 72
End: 2022-09-06

## 2022-12-02 ENCOUNTER — NON-APPOINTMENT (OUTPATIENT)
Age: 72
End: 2022-12-02

## 2022-12-05 ENCOUNTER — APPOINTMENT (OUTPATIENT)
Dept: ENDOCRINOLOGY | Facility: CLINIC | Age: 72
End: 2022-12-05

## 2022-12-05 DIAGNOSIS — L25.5 UNSPECIFIED CONTACT DERMATITIS DUE TO PLANTS, EXCEPT FOOD: ICD-10-CM

## 2022-12-05 PROCEDURE — 99213 OFFICE O/P EST LOW 20 MIN: CPT | Mod: 95

## 2022-12-05 NOTE — ASSESSMENT
[FreeTextEntry1] : 71 year old female with Type 1 DM, hypothyroidism and  hyperlipidemia.  Her diabetes is well controlled.  She is likely in prolonged honeymoon phase as evidenced by her very low insulin requirements.  She also has a thyroid nodule, obesity complicated by NAFLD and  severe OP.  She was recently diagnosed with contact dermatitis likely due to poison ivy or poison oak.  \par \par 1.  Contact dermatitis-  appears to be improving on visual inspection through telehealth.  Recommended that she only use topical steroids from this point forward to avoid steroid induced hyperglycemia.  \par 2.  T1DM-  will address insulin dosing later this week at her scheduled appointment in office.  For now, continue current insulin doses. \par     \par CGM medical necessity statement:\par Patient tests blood glucose 4 or more times a day and injects insulin 3 or more times per day.\par She has been seen regularly at this office within the past 6 months.\par She requires frequent adjustments to her insulin regimen on the basis of therapeutic CGM results using ISF of 1:50. \par \par  \par \par \par

## 2022-12-05 NOTE — HISTORY OF PRESENT ILLNESS
[FreeTextEntry1] : Telehealth visit conducted.\par Time started 11:48 AM\par Time Ended:  11:58 AM\par \par Made appointment today due to hyperglycemia from steroids last week. \par History of T1DM and hypothyroidism.  Also found to have a small thyroid nodule.\par PMH of breast cancer s/p mastectomy and has ANA mutation.  \par History of IgA deficiency.  At time of diagnosis of DM, was getting dexamethasone with IVIG infusion.\par Also has NAFLD and seeing GI.   Recently had Fibroscan.   \par Her insurance does not cover GLP-1 agonist for weight loss. \par Recently had DXA done with Dr. Wakefield showing worsening osteoporosis.  \par Does not want to take bisphosphonates.  Has extensive dental history with multiple crowns.  \par Forteo was too costly for patient.  \par \par Quality:  Type 1 DM  (+ BRYSON -65), initially thought to be type 2 and started on metformin.  \par Severity:  uncontrolled. \par Duration of diabetes:  diagnosed Jan 2020\par Onset:  found on routine labs, occurred in setting of dexamethasone use given with IVIG infusions for IgA deficiency\par Associated Complications/ Symptoms:  No known microvascular complications\par Modifying Factors:  Better with insulin\par \par SMBG:  Using Dexcom G6 CGM (prior to CGM, was testing 4 times a day).       \par \par Current Diabetic Medication Regimen:\par Basaglar  5 units qhs \par Humalog  with meals using  ICR 1:10, but not taking consistently.\par \par  Currently being treated for contact dermatitis on her right arm (likely poison oak) last week.\par Had dexamethasone injection on Friday.\par Had significant hyperglycemia on Friday night and Saturday.  \par Doubled her dose of Basaglar on Friday, and has since returned to normal dose. \par \par CGM yesterday showed much improved BG, with only a small postprandial spike in the afternoon.  \par

## 2022-12-08 ENCOUNTER — TRANSCRIPTION ENCOUNTER (OUTPATIENT)
Age: 72
End: 2022-12-08

## 2022-12-08 ENCOUNTER — APPOINTMENT (OUTPATIENT)
Dept: ENDOCRINOLOGY | Facility: CLINIC | Age: 72
End: 2022-12-08

## 2022-12-09 ENCOUNTER — NON-APPOINTMENT (OUTPATIENT)
Age: 72
End: 2022-12-09

## 2022-12-27 RX ORDER — BLOOD-GLUCOSE METER
W/DEVICE EACH MISCELLANEOUS
Qty: 1 | Refills: 0 | Status: ACTIVE | COMMUNITY
Start: 2022-12-27 | End: 1900-01-01

## 2023-01-03 ENCOUNTER — APPOINTMENT (OUTPATIENT)
Dept: ENDOCRINOLOGY | Facility: CLINIC | Age: 73
End: 2023-01-03
Payer: MEDICARE

## 2023-01-03 VITALS
OXYGEN SATURATION: 97 % | DIASTOLIC BLOOD PRESSURE: 70 MMHG | HEIGHT: 61 IN | TEMPERATURE: 97.6 F | RESPIRATION RATE: 18 BRPM | SYSTOLIC BLOOD PRESSURE: 120 MMHG | HEART RATE: 68 BPM | WEIGHT: 176.38 LBS | BODY MASS INDEX: 33.3 KG/M2

## 2023-01-03 LAB
GLUCOSE BLDC GLUCOMTR-MCNC: 124
HBA1C MFR BLD HPLC: 6.8
LDLC SERPL DIRECT ASSAY-MCNC: 171
TSH SERPL-ACNC: 2.39

## 2023-01-03 PROCEDURE — 99214 OFFICE O/P EST MOD 30 MIN: CPT | Mod: 25

## 2023-01-03 PROCEDURE — 95251 CONT GLUC MNTR ANALYSIS I&R: CPT

## 2023-01-03 PROCEDURE — 82962 GLUCOSE BLOOD TEST: CPT

## 2023-01-03 NOTE — REASON FOR VISIT
[Follow - Up] : a follow-up visit [DM Type 1] : DM Type 1 [Hypothyroidism] : hypothyroidism [Osteoporosis] : osteoporosis [Thyroid nodule/ MNG] : thyroid nodule/ MNG

## 2023-01-09 NOTE — H&P PST ADULT - GASTROINTESTINAL DETAILS
[facility]                                                        OUTPATIENT PHYSICAL THERAPY EDEMA EVALUATION  PLAN OF TREATMENT FOR OUTPATIENT REHABILITATION  (COMPLETE FOR INITIAL CLAIMS ONLY)  Patient's Last Name, First Name, Markie Ramsey                           Provider s Name:   [facility] Medical Record No.  [id number]     Start of Care Date:  01/09/23   Onset Date:  01/01/21   Type:  PT   Medical Diagnosis:  (P) B LE Edema   Therapy Diagnosis:  Lymphedema secondary to CVI Visits from SOC:  1                                     __________________________________________________________________________________   Plan of Treatment/Functional Goals:    Fit for compression garment, Precautions to prevent infection / exacerbation, Education, ADL training, Skin care / precautions, Home management program development, Manual therapy        GOALS  1. Goal description: Pt will recognize 3 things that he can do to best manage chronic dependent edema.       Target date: 01/09/23  2. Goal description: Pt will report improved understanding of how to find the right compression and tools that are available to help with donning for best management of edema       Target date: 01/09/23  3. Goal description: Pt will obtain well fitting compression and use when he is going out of the home and unable to elevate his legs for periods longer than 4 hours to prevent wound.       Target date: 04/08/23              Treatment Frequency: Other (see comments)   Treatment duration: 3 visits over 90 days    La Harper, PT                                    I CERTIFY THE NEED FOR THESE SERVICES FURNISHED UNDER        THIS PLAN OF TREATMENT AND WHILE UNDER MY CARE     (Physician co-signature of this document indicates review and certification of the therapy plan).                   Certification date  from: 01/09/23       Certification date to: 04/08/23           Referring physician: Dr. Andrea Lockett   Initial Assessment  See Epic Evaluation- Start of care: 01/09/23 01/09/23 1200   Quick Adds   Quick Adds Certification   Rehab Discipline   Discipline PT   Type of Visit   Type of visit Initial Edema Evaluation       present No   General Information   Start of care 01/09/23   Referring physician Dr. Andrea Lockett   Orders Evaluate and treat as indicated   Order date 12/26/22   Medical diagnosis Edema, Lymphedema   Onset of illness / date of surgery 01/01/21   Edema onset 01/01/21   Affected body parts RLE;LLE   Edema etiology Chronic Venous Insufficiency   Edema etiology comments Pt is legally blind. Pt with PMH including CAD s/pPCI, EMORY using CPAP, COPD, HF with preserved EF, chronic venous stasis.  Pt also reports OA in the knees.   Pertinent history of current problem (PT: include personal factors and/or comorbidities that impact the POC; OT: include additional occupational profile info) Pt reports that his legs discoloration is more of the concern than the swelling. He has  had periods of skin opening in gaiter area of lower legs with wounds but no wounds present today   Surgical / medical history reviewed Yes   Prior level of functional mobility Pt has lost about 30# in the past year but eating less and exercising more. He has a stepper he uses at the kitchen counter, elastic bands.   Prior treatment Compression garments;Elevation;Diuretics  (hard to don and off, uses recliner when sitting. uses 40mg of 2 diuretics AM and PM. Torsemide and Potassium Chloride.)   Community support Family / friend caregiver  (grandson present today)   Patient role / employment history Retired   Living environment Apartment / condo   Living environment comments Pt lives alone, grandson helps with appts and errands on Mondays otherwise a few friends will help out but pt doesn't have any formal care in  home.   Current assistive devices Standard cane   Assistive device comments when outside of apartment   General observations Pt presents with grandson who is EMT   Fall Risk Screen   Fall screen completed by PT   Have you fallen 2 or more times in the past year? No   Have you fallen and had an injury in the past year? No   Is patient a fall risk? No   Fall screen comments No falls for a few years. Pt is very careful, doesn't go out alone because of the vision   System Outcome Measures   Outcome Measures Lymphedema   Lymphedema Life Impact Scale (score range 0-72). A higher score indicates greater impairment.   (not taken as pt not likely to return and reports no interfence/symptoms of swelling)   Subjective Report   Patient report of symptoms legs and feet and purple, no pain or numbness   Patient / Family Goals   Patient / family goals statement I can't get my compression socks on and I know I should wear them   Pain   Patient currently in pain No   Cognitive Status   Orientation Orientation to person, place and time   Level of consciousness Alert   Follows commands and answers questions 100% of the time   Personal safety and judgement Intact   Memory Intact   Edema Exam / Assessment   Skin condition Pitting;Intact;Dryness;Venous distention;Hemosiderin deposits   Skin condition comments Pt with 1+ pre-tib pitting, no foot pitting. Hyperpigmentaion of the skin from hemosiderin and also blue hue when legs are dependent. Small red blistering on L shin   Scar Yes   Location R shin   Mobility good   Scar comments from wound   Stemmer sign Negative   Stemmer sign comments bilteral   Ulceration No   Ulceration comments Pt does have scabs on 2nd and 3rd digit of the L foot   Volume LE   Right LE (mL) ankle 28.1cm, calf 42cm   Left LE (mL) ankle 26/7cm, calf 41.1cm   Range of Motion   ROM comments Trouble reaching feet due to abdomen   Strength   Strength comments general deconditioning, stands independently with use of  hands   Posture   Posture Forward head position   Activities of Daily Living   Activities of Daily Living independent, reports slow with dressing, does get own socks and shoes on but not compression   Bed Mobility   Bed mobility min a from mat but independent at home   Transfers   Transfers independent, slow   Gait / Locomotion   Gait / Locomotion uses cane and grandson to guide as needed though able to follow therapist in clinic   Sensory   Sensory perception comments vision impaired, light touch intact at feet   Vascular Assessment   Vascular Assessment Comments definite color change when legs are dependent   Planned Edema Interventions   Planned edema interventions Fit for compression garment;Precautions to prevent infection / exacerbation;Education;ADL training;Skin care / precautions;Home management program development;Manual therapy   Clinical Impression   Criteria for skilled therapeutic intervention met Yes   Therapy diagnosis Lymphedema secondary to CVI   Influenced by the following impairments / conditions Stage 1;Phlebolymphedema   Functional limitations due to impairments / conditions risk of worsening and infection, hx of wounds   Clinical Presentation Stable/Uncomplicated   Clinical Presentation Rationale not changed much for 2 years   Clinical Decision Making (Complexity) Low complexity   Treatment Frequency Other (see comments)   Treatment duration 3 visits over 90 days   Patient / family and/or staff in agreement with plan of care Yes   Risks and benefits of therapy have been explained Yes   Clinical impression comments pt to obtain new compression and use on days he is out and legs will be dependent. At home pt will elevate his legs when he is not exercising or having a meal   Education Assessment   Preferred learning style Listening;Reading;Demonstration;Pictures / video   Barriers to learning Visual;Physical   Goals   Edema Eval Goals 1;2;3   Goal 1   Goal identifier Risk Reduction   Goal  description Pt will recognize 3 things that he can do to best manage chronic dependent edema.   Goal Progress Pt librado add drinking more water, be sure to elevate, and watch his salt   Target date 01/09/23   Date met 01/09/23   Goal 2   Goal identifier Management   Goal description Pt will report improved understanding of how to find the right compression and tools that are available to help with donning for best management of edema   Goal Progress resources provided to dia   Target date 01/09/23   Date met 01/09/23   Goal 3   Goal identifier Compression   Goal description Pt will obtain well fitting compression and use when he is going out of the home and unable to elevate his legs for periods longer than 4 hours to prevent wound.   Target date 04/08/23   Total Evaluation Time   PT Chavez, Low Complexity Minutes (70245) 10   Certification   Certification date from 01/09/23   Certification date to 04/08/23   Medical Diagnosis B LE Edema      no bruit/bowel sounds normal/nontender/soft

## 2023-02-03 RX ORDER — LANCETS
EACH MISCELLANEOUS
Qty: 300 | Refills: 1 | Status: ACTIVE | COMMUNITY
Start: 2022-12-27 | End: 1900-01-01

## 2023-02-03 NOTE — ASSESSMENT
[FreeTextEntry1] : 72 year old female with Type 1 DM, hypothyroidism, hyperlipidemia, thyroid nodule,  obesity complicated by NAFLD and severe OP here for endocrine follow up.  Her diabetes control is improving based on CGM.  \par \par 1. Type 1 DM-   Continue basal bolus insulin, but encouraged patient to improve compliance with bolus insulin.    \par 2. hypothyroidism- continue LT4\par 3. Hyperlipidemia- Now that LFTs have improved, will try Atorvastatin 10 mg daily.    \par 4. Thyroid nodule- Repeat US in 5/2023.    \par 5. Osteoporosis-  On weekly Vitamin D.   Forteo was too expensive.  Encouraged her to consider Prolia.  \par  \par CGM medical necessity statement:\par Patient tests blood glucose 4 or more times a day and injects insulin 3 or more times per day.\par She has been seen regularly at this office within the past 6 months.\par She requires frequent adjustments to her insulin regimen on the basis of therapeutic CGM results using ISF of 1:50. \par \par Follow up in 4 months.

## 2023-02-03 NOTE — HISTORY OF PRESENT ILLNESS
[FreeTextEntry1] : Follow up of T1DM,  hypothyroidism and a small thyroid nodule.\par PMH of breast cancer s/p mastectomy and has ANA mutation.  \par History of IgA deficiency.  At time of diagnosis of DM, was getting dexamethasone with IVIG infusion.\par Also has NAFLD and seeing GI.    \par Her insurance does not cover GLP-1 agonist for weight loss. \par Had DXA done with Dr. Wakefield showing worsening osteoporosis.  \par Does not want to take bisphosphonates.  Has extensive dental history with multiple crowns.  \par Forteo was too costly for patient.  \par \par Quality:  Type 1 DM  (+ BRYSON -65), initially thought to be type 2 and started on metformin.  \par Severity:  uncontrolled. \par Duration of diabetes:  diagnosed Jan 2020\par Onset:  found on routine labs, occurred in setting of dexamethasone use given with IVIG infusions for IgA deficiency\par Associated Complications/ Symptoms:  No known microvascular complications\par Modifying Factors:  Better with insulin\par \par SMBG:  Using Dexcom G6 CGM (prior to CGM, was testing 4 times a day).    She currently uses 1 strip per day while on CGM for calibration purposes and for testing when CGM is in warm up.\par Reviewed CGM download:  average BG is 173 mg/dl with SD of 41.  64% of BG within target range, 35% above target and < 1 % below target.  Having some episodes of PP hyperglycemia, but improving since D/C of steroids.        \par \par Current Diabetic Medication Regimen:\par Basaglar  5 units qhs \par Humalog  with meals using  ICR 1:10-  still not taking with all meals.\par \par  Poison Ivy has now cleared up and has been off steroids.  \par

## 2023-02-03 NOTE — DATA REVIEWED
[FreeTextEntry1] : LABS:\par 8/16/2022:\par TSH 2.78\par AST 53\par ALT 77\par \par 4/11/2022:\par AST 68\par ALT 80\par TSH 2.14\par \par 10/26/2021:\par ALT 68\par AST 59\par \par \par 8/31/2020\par A1c  6.4%\par TSH  1.4\par ALT 90\par AST  86\par \par 1/11/2019:\par AST  175,  ALT  130\par Creatinine 0.69\par C-peptide 2.82\par BRYSON-65 antibody:  118 \par A1c 10.7%\par \par Thyroid US:  5/26/2022:\par RUP 0.3 cm cyst\par RUP 0.3 cm rim calcified nodule\par \par DXA 8/8/2022:\par L spine T score -4.5\par Left femoral Neck -2.7\par Left total Hip -1.9

## 2023-02-23 RX ORDER — INSULIN LISPRO 100 [IU]/ML
100 INJECTION, SOLUTION INTRAVENOUS; SUBCUTANEOUS
Qty: 2 | Refills: 3 | Status: ACTIVE | COMMUNITY
Start: 1900-01-01 | End: 1900-01-01

## 2023-03-30 LAB
HBA1C MFR BLD HPLC: 7.1
LDLC SERPL CALC-MCNC: 188
MICROALBUMIN/CREAT 24H UR-RTO: 56

## 2023-03-31 ENCOUNTER — APPOINTMENT (OUTPATIENT)
Dept: ENDOCRINOLOGY | Facility: CLINIC | Age: 73
End: 2023-03-31
Payer: MEDICARE

## 2023-03-31 VITALS
HEART RATE: 65 BPM | OXYGEN SATURATION: 96 % | TEMPERATURE: 98.7 F | RESPIRATION RATE: 18 BRPM | DIASTOLIC BLOOD PRESSURE: 60 MMHG | WEIGHT: 172 LBS | HEIGHT: 61 IN | BODY MASS INDEX: 32.47 KG/M2 | SYSTOLIC BLOOD PRESSURE: 126 MMHG

## 2023-03-31 LAB — GLUCOSE BLDC GLUCOMTR-MCNC: 145

## 2023-03-31 PROCEDURE — 99214 OFFICE O/P EST MOD 30 MIN: CPT

## 2023-03-31 PROCEDURE — 95251 CONT GLUC MNTR ANALYSIS I&R: CPT

## 2023-03-31 PROCEDURE — 82962 GLUCOSE BLOOD TEST: CPT

## 2023-03-31 RX ORDER — LOSARTAN POTASSIUM 50 MG/1
50 TABLET, FILM COATED ORAL
Refills: 0 | Status: ACTIVE | COMMUNITY

## 2023-03-31 RX ORDER — TERIPARATIDE 250 UG/ML
600 INJECTION, SOLUTION SUBCUTANEOUS DAILY
Qty: 3 | Refills: 1 | Status: DISCONTINUED | COMMUNITY
Start: 2022-08-19 | End: 2023-03-31

## 2023-03-31 RX ORDER — BLOOD SUGAR DIAGNOSTIC
STRIP MISCELLANEOUS
Qty: 300 | Refills: 3 | Status: DISCONTINUED | COMMUNITY
Start: 2020-04-24 | End: 2023-03-31

## 2023-03-31 RX ORDER — ATORVASTATIN CALCIUM 10 MG/1
10 TABLET, FILM COATED ORAL
Qty: 1 | Refills: 1 | Status: DISCONTINUED | COMMUNITY
Start: 2023-01-03 | End: 2023-03-31

## 2023-03-31 RX ORDER — HYDROCHLOROTHIAZIDE 25 MG/1
25 TABLET ORAL
Refills: 0 | Status: ACTIVE | COMMUNITY

## 2023-03-31 RX ORDER — AMLODIPINE BESYLATE 5 MG/1
5 TABLET ORAL
Refills: 0 | Status: ACTIVE | COMMUNITY

## 2023-03-31 RX ORDER — ZOLPIDEM TARTRATE 5 MG/1
TABLET, FILM COATED ORAL
Refills: 0 | Status: DISCONTINUED | COMMUNITY
End: 2023-03-31

## 2023-03-31 NOTE — DATA REVIEWED
[FreeTextEntry1] : LABS:\par 3/28/2023:\par TSH 2.15\par 25(OH)D 19\par \par 8/16/2022:\par TSH 2.78\par AST 53\par ALT 77\par \par 4/11/2022:\par AST 68\par ALT 80\par TSH 2.14\par \par 10/26/2021:\par ALT 68\par AST 59\par \par \par 8/31/2020\par A1c  6.4%\par TSH  1.4\par ALT 90\par AST  86\par \par 1/11/2019:\par AST  175,  ALT  130\par Creatinine 0.69\par C-peptide 2.82\par BRYSON-65 antibody:  118 \par A1c 10.7%\par \par Thyroid US:  5/26/2022:\par RUP 0.3 cm cyst\par RUP 0.3 cm rim calcified nodule\par \par DXA 8/8/2022:\par L spine T score -4.5\par Left femoral Neck -2.7\par Left total Hip -1.9

## 2023-03-31 NOTE — ASSESSMENT
[FreeTextEntry1] : 72 year old female with Type 1 DM, hypothyroidism, hyperlipidemia, thyroid nodule,  obesity complicated by NAFLD and severe OP here for endocrine follow up.  Her diabetes control has worsened.\par \par 1. Type 1 DM-   Increase Basaglar to 6 units qhs  \par 2. hypothyroidism- continue LT4\par 3. Hyperlipidemia- Could not tolerate Atorvastatin due to nausea and myalgia.  Try Rosuvastatin 5 mg daily.  \par 4. Thyroid nodule- Consider repeat US after next visit.      \par 5. Osteoporosis-  Resume weekly Vitamin D.  Continue Prolia and calcium supplementation. \par  \par CGM medical necessity statement:\par Patient tests blood glucose 4 or more times a day and injects insulin 3 or more times per day.\par She has been seen regularly at this office within the past 6 months.\par She requires frequent adjustments to her insulin regimen on the basis of therapeutic CGM results using ISF of 1:50. \par \par Follow up in 4 months.

## 2023-03-31 NOTE — HISTORY OF PRESENT ILLNESS
[FreeTextEntry1] : Follow up of T1DM,  hypothyroidism and a small thyroid nodule.\par PMH of breast cancer s/p mastectomy and has ANA mutation.  \par History of IgA deficiency.  At time of diagnosis of DM, was getting dexamethasone with IVIG infusion.\par Also has NAFLD and seeing GI.    \par Her insurance does not cover GLP-1 agonist for weight loss. \par Had DXA done with Dr. Wakefield showing worsening osteoporosis.  \par Does not want to take bisphosphonates.  Has extensive dental history with multiple crowns.  \par Forteo was too costly for patient.   Prolia was started in January 2023.  \par \par Quality:  Type 1 DM  (+ BRYSON -65), initially thought to be type 2 and started on metformin.  \par Severity:  uncontrolled. \par Duration of diabetes:  diagnosed Jan 2020\par Onset:  found on routine labs, occurred in setting of dexamethasone use given with IVIG infusions for IgA deficiency\par Associated Complications/ Symptoms:  No known microvascular complications\par Modifying Factors:  Better with insulin\par \par SMBG:  Using Dexcom G6 CGM (prior to CGM, was testing 4 times a day).    She currently uses 1 strip per day while on CGM for calibration purposes and for testing when CGM is in warm up.\par Reviewed CGM download:  average BG is 161 mg/dl with CV of  21%.  76% of BG within target range, 24% above target and 0 % below target.         \par \par Current Diabetic Medication Regimen:\par Basaglar  5 units qhs \par Humalog  with meals using  ICR 1:10\par \par Saddened by the death of her brother in January.\par Recently had pelvic fracture-  started Prolia in January. \par Has gained weight due to decreased activity.  \par \par

## 2023-03-31 NOTE — REASON FOR VISIT
[Follow - Up] : a follow-up visit [DM Type 1] : DM Type 1 [Hypothyroidism] : hypothyroidism [Osteoporosis] : osteoporosis

## 2023-05-03 ENCOUNTER — NON-APPOINTMENT (OUTPATIENT)
Age: 73
End: 2023-05-03

## 2023-05-31 ENCOUNTER — TRANSCRIPTION ENCOUNTER (OUTPATIENT)
Age: 73
End: 2023-05-31

## 2023-09-01 ENCOUNTER — APPOINTMENT (OUTPATIENT)
Dept: ENDOCRINOLOGY | Facility: CLINIC | Age: 73
End: 2023-09-01
Payer: MEDICARE

## 2023-09-01 VITALS
DIASTOLIC BLOOD PRESSURE: 60 MMHG | SYSTOLIC BLOOD PRESSURE: 130 MMHG | OXYGEN SATURATION: 97 % | HEIGHT: 61 IN | TEMPERATURE: 97.7 F | RESPIRATION RATE: 16 BRPM | HEART RATE: 65 BPM | BODY MASS INDEX: 30.78 KG/M2 | WEIGHT: 163 LBS

## 2023-09-01 PROCEDURE — 95251 CONT GLUC MNTR ANALYSIS I&R: CPT

## 2023-09-01 PROCEDURE — 99214 OFFICE O/P EST MOD 30 MIN: CPT | Mod: 25

## 2023-09-01 RX ORDER — BLOOD-GLUCOSE SENSOR
EACH MISCELLANEOUS
Qty: 3 | Refills: 3 | Status: DISCONTINUED | COMMUNITY
Start: 2020-04-21 | End: 2023-09-01

## 2023-09-01 RX ORDER — BLOOD-GLUCOSE METER
W/DEVICE EACH MISCELLANEOUS
Qty: 1 | Refills: 0 | Status: DISCONTINUED | COMMUNITY
End: 2023-09-01

## 2023-09-01 RX ORDER — BLOOD-GLUCOSE SENSOR
EACH MISCELLANEOUS
Refills: 0 | Status: ACTIVE | COMMUNITY

## 2023-09-01 RX ORDER — BLOOD-GLUCOSE TRANSMITTER
EACH MISCELLANEOUS
Qty: 1 | Refills: 1 | Status: DISCONTINUED | COMMUNITY
Start: 2020-04-21 | End: 2023-09-01

## 2023-09-01 RX ORDER — LANCETS 28 GAUGE
EACH MISCELLANEOUS
Qty: 200 | Refills: 1 | Status: DISCONTINUED | COMMUNITY
Start: 2020-01-14 | End: 2023-09-01

## 2023-09-01 RX ORDER — BLOOD-GLUCOSE,RECEIVER,CONT
EACH MISCELLANEOUS
Qty: 1 | Refills: 0 | Status: DISCONTINUED | COMMUNITY
Start: 2020-04-21 | End: 2023-09-01

## 2023-09-01 RX ORDER — ERGOCALCIFEROL 1.25 MG/1
1.25 MG CAPSULE ORAL
Refills: 0 | Status: DISCONTINUED | COMMUNITY
End: 2023-09-01

## 2023-09-01 NOTE — ASSESSMENT
[FreeTextEntry1] : 72 year old female with Type 1 DM, hypothyroidism, hyperlipidemia, thyroid nodule,  obesity complicated by NAFLD and severe OP here for endocrine follow up.     1. Type 1 DM-   Increase Basaglar to 8 units qhs.  Cover all  meals with Humalog.    2. hypothyroidism- continue LT4 3. Hyperlipidemia-  Continue Rosuvastatin 5 mg daily.   4. Thyroid nodule- Consider repeat US in future.   5. Osteoporosis-  Resume weekly Vitamin D.  Continue Prolia and calcium supplementation.    CGM medical necessity statement: Patient tests blood glucose 4 or more times a day and injects insulin 3 or more times per day. She has been seen regularly at this office within the past 6 months. She requires frequent adjustments to her insulin regimen on the basis of therapeutic CGM results using ISF of 1:50.   Follow up in 4 months.

## 2023-09-01 NOTE — DATA REVIEWED
[FreeTextEntry1] : LABS: 5/23/2023: A1c 7%  TSH 1.8  3/28/2023: TSH 2.15 25(OH)D 19  8/16/2022: TSH 2.78 AST 53 ALT 77  4/11/2022: AST 68 ALT 80 TSH 2.14  10/26/2021: ALT 68 AST 59   8/31/2020 A1c  6.4% TSH  1.4 ALT 90 AST  86  1/11/2019: AST  175,  ALT  130 Creatinine 0.69 C-peptide 2.82 BRYSON-65 antibody:  118  A1c 10.7%  Thyroid US:  5/26/2022: RUP 0.3 cm cyst RUP 0.3 cm rim calcified nodule  DXA 8/8/2022: L spine T score -4.5 Left femoral Neck -2.7 Left total Hip -1.9

## 2023-09-01 NOTE — HISTORY OF PRESENT ILLNESS
[FreeTextEntry1] : Follow up of T1DM,  hypothyroidism and a small thyroid nodule. PMH of breast cancer s/p mastectomy and has ANA mutation.   History of IgA deficiency.  At time of diagnosis of DM, was getting dexamethasone with IVIG infusion. Also has NAFLD and seeing GI.     Her insurance does not cover GLP-1 agonist for weight loss.  Had DXA done with Dr. Wakefield showing worsening osteoporosis.   Does not want to take bisphosphonates.  Has extensive dental history with multiple crowns.   Forteo was too costly for patient.   Prolia was started in January 2023.    Quality:  Type 1 DM  (+ BRYSON -65), initially thought to be type 2 and started on metformin.   Severity:  uncontrolled.  Duration of diabetes:  diagnosed Jan 2020 Onset:  found on routine labs, occurred in setting of dexamethasone use given with IVIG infusions for IgA deficiency Associated Complications/ Symptoms:  No known microvascular complications Modifying Factors:  Better with insulin  SMBG:  Using Dexcom G6 CGM (prior to CGM, was testing 4 times a day).    She currently uses 1 strip per day while on CGM for calibration purposes and for testing when CGM is in warm up. Reviewed CGM download:  average BG is 162 mg/dl with SD of 33.  75% of BG within target range, 25% above target and 0 % below target.           Current Diabetic Medication Regimen: Basaglar  6 units qhs  Humalog  with meals using  ICR 1:8-   has not always been covering for breakfast.

## 2023-09-18 ENCOUNTER — RX RENEWAL (OUTPATIENT)
Age: 73
End: 2023-09-18

## 2023-09-21 ENCOUNTER — RX RENEWAL (OUTPATIENT)
Age: 73
End: 2023-09-21

## 2023-11-14 ENCOUNTER — NON-APPOINTMENT (OUTPATIENT)
Age: 73
End: 2023-11-14

## 2023-12-01 ENCOUNTER — NON-APPOINTMENT (OUTPATIENT)
Age: 73
End: 2023-12-01

## 2023-12-01 ENCOUNTER — APPOINTMENT (OUTPATIENT)
Dept: OPHTHALMOLOGY | Facility: CLINIC | Age: 73
End: 2023-12-01
Payer: MEDICARE

## 2023-12-01 PROCEDURE — 92202 OPSCPY EXTND ON/MAC DRAW: CPT

## 2023-12-01 PROCEDURE — 92004 COMPRE OPH EXAM NEW PT 1/>: CPT

## 2023-12-29 ENCOUNTER — RX RENEWAL (OUTPATIENT)
Age: 73
End: 2023-12-29

## 2023-12-29 RX ORDER — BLOOD SUGAR DIAGNOSTIC
STRIP MISCELLANEOUS
Qty: 100 | Refills: 1 | Status: ACTIVE | COMMUNITY
Start: 2022-12-27 | End: 1900-01-01

## 2024-01-18 ENCOUNTER — APPOINTMENT (OUTPATIENT)
Dept: RHEUMATOLOGY | Facility: CLINIC | Age: 74
End: 2024-01-18
Payer: COMMERCIAL

## 2024-01-18 VITALS
TEMPERATURE: 98 F | HEIGHT: 61 IN | SYSTOLIC BLOOD PRESSURE: 143 MMHG | HEART RATE: 56 BPM | WEIGHT: 182 LBS | OXYGEN SATURATION: 96 % | DIASTOLIC BLOOD PRESSURE: 72 MMHG | BODY MASS INDEX: 34.36 KG/M2

## 2024-01-18 DIAGNOSIS — S72.90XA UNSPECIFIED FRACTURE OF UNSPECIFIED FEMUR, INITIAL ENCOUNTER FOR CLOSED FRACTURE: ICD-10-CM

## 2024-01-18 DIAGNOSIS — M81.0 AGE-RELATED OSTEOPOROSIS W/OUT CURRENT PATHOLOGICAL FRACTURE: ICD-10-CM

## 2024-01-18 PROCEDURE — 99215 OFFICE O/P EST HI 40 MIN: CPT

## 2024-01-24 NOTE — ASSESSMENT
[FreeTextEntry1] : 72 y/o female with T1DM and NAFLD, osteoporosis presented as follow up for osteoporosis.  Pt has Hx of breast CA s/p mastectomy and lymph node excision, hysterectomy, tamoxifen, currently in remission. Pt has developed T1DM, receives IVIG by hem/onc for "no immune system".  Pt follows with GI for NAFLD.  Pt did not want to start bisphosphonates due to Hx of extensive dental history with multiple crowns, bridge that needs to be changed. Pt is pending possible tooth removal in the future. Pt was Rx'd Forteo by endocrinology but is unable to pay for it ($9470-6848/month). Pt was ordered Prolia by oncologist but is still >$800 out of pocket.  Hx of R femur and tibia fracture from 2012 related to injury (90lb dog running into her). No pathological fracture.  DXA from 8/2022 with lowest T-score L-spine -4.5 (worsening compared to prior DXA scans).   When pt was seen by me in 9/2022, I agreed with use of anabolic agents for pt's severe osteoporosis but pt could not afford. Since last visit, pt has been receiving Prolia by hem/onc (pt follows for hypogammaglobulinemia and gets IVIG). Last Prolia dose ~8/2023. Pt now has a new insurance and would like to try to try for Forteo again.  - Rx Forteo. Side effects of teriparatide were discussed with the pt in detail including headache, nausea, arthralgia, orthostasis, flushing, hypercalcemia, hyperuricemia. Avoid if Pagets disease, increased alkaline phosphatase, skeletal metastasis, myeloma, severe renal dysfunction. - Continue Ca and Vit D supplementation. Advised to reduce or eliminate tobacco, alcohol, caffeine intake, increase exercise, and minimize fall risk. - Discussed with patient at length about treatment of OA and soft tissue injuries including oral/topical analgesics, pain therapies (yoga, chika chi, acupuncture, chiropractor, massage therapy, wax therapy, etc.), PT/OT, steroid injections, surgeries. Advised on healthy diet, exercise, smoking avoidance, weight loss, stress management, sleep hygiene, control of comorbidities to help with management of pain and improve daily function. - Last DXA 8/2022. Next DXA after 8/2024. - Will contact pt when Vit D level is received from [facility] for safety of starting Forteo. RTC 6 months for follow up  THE DURATION OF THE CLINIC VISIT WAS 60 MINUTES  ADDENDUM: Vit D 40.2 on 1/23/24, pt can start Forteo when she receives

## 2024-01-24 NOTE — HISTORY OF PRESENT ILLNESS
[FreeTextEntry1] : HISTORY:  74 y/o female with T1DM and NAFLD, osteoporosis presented as follow up for osteoporosis.  Pt has Hx of breast CA s/p mastectomy and lymph node excision, hysterectomy, tamoxifen, currently in remission. Pt has developed T1DM, receives IVIG by hem/onc for "no immune system".  Pt follows with GI for NAFLD.  Pt did not want to start bisphosphonates due to Hx of extensive dental history with multiple crowns, bridge that needs to be changed. Pt is pending possible tooth removal in the future. Pt was Rx'd Forteo by endocrinology but is unable to pay for it ($5582-5509/month). Pt was ordered Prolia by oncologist but is still >$800 out of pocket.  Hx of R femur and tibia fracture from 2012 related to injury (90lb dog running into her). No pathological fracture.  DXA from 8/2022 with lowest T-score L-spine -4.5 (worsening compared to prior DXA scans).   INTERVAL HISTORY:  When pt was seen by me in 9/2022, I agreed with use of anabolic agents for pt's severe osteoporosis but pt could not afford. Since last visit, pt has been receiving Prolia by hem/onc (pt follows for hypogammaglobulinemia and gets IVIG).  Pt had last labs 1/2024, does not need repeat labwork. Pt will get repeat labs including Vit D next Monday. Pt also discussed with me today about her b/l hands and shoulder pains (likely combination of OA, tendinitis).  WORKUP:  DXA (8/2022): Lowest T-score L-spine -4.5, L femoral neck -2.7, L total hip -1.9, R femoral neck -2.7, R total hip -2.2

## 2024-01-24 NOTE — PHYSICAL EXAM
[TextEntry] : GENERAL: Appears in no acute distress HEENT: EOMI, PERRLA. No conjunctival erythema. Moist mucous membranes. No nasopharyngeal ulcers NECK: Supple, no cervical lymphadenopathy, no thyromegaly CARDIOVASCULAR: RRR. S1, S2 auscultated. No murmurs or rubs. PULMONARY: Clear to auscultation b/l, no wheezes, rales, or crackles ABDOMINAL: Soft, nontender, nondistended. Bowel sounds present. No organomegaly. MSK: No active synovitis, swelling, erythema, or warmth. No Lisa's or Heberden's nodules. No deformities. Normal ROM of neck, back, b/l upper and lower extremities. SKIN: No lesions or rashes NEURO: No focal deficits. PSYCH: AAOx3. Normal affect and thought process.

## 2024-02-01 ENCOUNTER — APPOINTMENT (OUTPATIENT)
Dept: ENDOCRINOLOGY | Facility: CLINIC | Age: 74
End: 2024-02-01
Payer: COMMERCIAL

## 2024-02-01 VITALS
BODY MASS INDEX: 34.17 KG/M2 | DIASTOLIC BLOOD PRESSURE: 64 MMHG | HEIGHT: 61 IN | WEIGHT: 181 LBS | OXYGEN SATURATION: 99 % | SYSTOLIC BLOOD PRESSURE: 136 MMHG | RESPIRATION RATE: 16 BRPM | HEART RATE: 61 BPM

## 2024-02-01 LAB — GLUCOSE BLDC GLUCOMTR-MCNC: 103

## 2024-02-01 PROCEDURE — 99214 OFFICE O/P EST MOD 30 MIN: CPT

## 2024-02-01 PROCEDURE — 95251 CONT GLUC MNTR ANALYSIS I&R: CPT

## 2024-02-01 PROCEDURE — G2211 COMPLEX E/M VISIT ADD ON: CPT

## 2024-02-01 RX ORDER — PEN NEEDLE, DIABETIC 32GX 5/32"
32G X 4 MM NEEDLE, DISPOSABLE MISCELLANEOUS
Qty: 400 | Refills: 1 | Status: DISCONTINUED | COMMUNITY
Start: 2020-01-31 | End: 2024-02-01

## 2024-02-01 RX ORDER — PEN NEEDLE, DIABETIC 29 G X1/2"
32G X 4 MM NEEDLE, DISPOSABLE MISCELLANEOUS
Qty: 4 | Refills: 1 | Status: ACTIVE | COMMUNITY
Start: 2023-09-28 | End: 1900-01-01

## 2024-02-01 NOTE — HISTORY OF PRESENT ILLNESS
[FreeTextEntry1] : Follow up of T1DM,  hypothyroidism and a small thyroid nodule.  PMH of breast cancer s/p mastectomy and has ANA mutation.   History of IgA deficiency.  At time of diagnosis of DM, was getting dexamethasone with IVIG infusion. Also has NAFLD and seeing GI.     Her insurance does not cover GLP-1 agonist for weight loss.  Had DXA done with Dr. Wakefield showing worsening osteoporosis.   Does not want to take bisphosphonates.  Has extensive dental history with multiple crowns.   Forteo was too costly for patient.   Prolia was started in January 2023.  Now following with Rheumatology.  Just got approval for generic Teriparatide and may be switching to this.    Quality:  Type 1 DM  (+ BRYSON -65), initially thought to be type 2 and started on metformin.   Severity:  uncontrolled.  Duration of diabetes:  diagnosed Jan 2020 Onset:  found on routine labs, occurred in setting of dexamethasone use given with IVIG infusions for IgA deficiency Associated Complications/ Symptoms:  No known microvascular complications Modifying Factors:  Better with insulin  SMBG:  Using Dexcom G7 CGM (prior to CGM, was testing 4 times a day).    She currently uses 1 strip per day while on CGM for calibration purposes and for testing when CGM is in warm up. Reviewed CGM download:  average BG is 162 mg/dl with SD of 33.  74% of BG within target range, 25% above target and 1 % below target.      Having some postprandial hyperglycemia.     Current Diabetic Medication Regimen: Basaglar 8 units qhs  Humalog  with meals using  ICR 1:8.  Has been working on improving her compliance with bolus insulin.

## 2024-02-01 NOTE — DATA REVIEWED
[FreeTextEntry1] : LABS: 1/22/2024: UACR 34  TSH 1.29 25(OH)D 40 A1c 7.4%  5/23/2023: A1c 7%  TSH 1.8  3/28/2023: TSH 2.15 25(OH)D 19  8/16/2022: TSH 2.78 AST 53 ALT 77  4/11/2022: AST 68 ALT 80 TSH 2.14  10/26/2021: ALT 68 AST 59   8/31/2020 A1c  6.4% TSH  1.4 ALT 90 AST  86  1/11/2019: AST  175,  ALT  130 Creatinine 0.69 C-peptide 2.82 BRYSON-65 antibody:  118  A1c 10.7%  Thyroid US:  5/26/2022: RUP 0.3 cm cyst RUP 0.3 cm rim calcified nodule  DXA 8/8/2022: L spine T score -4.5 Left femoral Neck -2.7 Left total Hip -1.9

## 2024-02-01 NOTE — ASSESSMENT
[FreeTextEntry1] : 73 year old female with Type 1 DM, hypothyroidism, hyperlipidemia, thyroid nodule,  obesity complicated by NAFLD and severe OP here for endocrine follow up.   Her diabetes control has worsened slightly.    1. Type 1 DM-  Lower ICR to 1:7 to prevent PP hyperglycemia 2. hypothyroidism- continue current dose of LT4 3. Hyperlipidemia-  Continue Rosuvastatin 5 mg daily.   4. Thyroid nodule- nodules were very small on imaging in 2022.  Consider repeat imaging later this year.   5. Osteoporosis-  Continue weekly Vitamin D.  Follow up with Rheumatology for treatment.     CGM medical necessity statement: Patient tests blood glucose 4 or more times a day and injects insulin 3 or more times per day. She has been seen regularly at this office within the past 6 months. She requires frequent adjustments to her insulin regimen on the basis of therapeutic CGM results using ISF of 1:50.   Follow up in 4 months.

## 2024-02-06 ENCOUNTER — RX RENEWAL (OUTPATIENT)
Age: 74
End: 2024-02-06

## 2024-02-06 RX ORDER — INSULIN GLARGINE 100 [IU]/ML
100 INJECTION, SOLUTION SUBCUTANEOUS
Qty: 1 | Refills: 1 | Status: ACTIVE | COMMUNITY
Start: 2024-02-06 | End: 1900-01-01

## 2024-03-25 ENCOUNTER — RX RENEWAL (OUTPATIENT)
Age: 74
End: 2024-03-25

## 2024-03-25 RX ORDER — ERGOCALCIFEROL 1.25 MG/1
1.25 MG CAPSULE, LIQUID FILLED ORAL
Qty: 13 | Refills: 1 | Status: ACTIVE | COMMUNITY
Start: 2023-03-31 | End: 1900-01-01

## 2024-03-25 RX ORDER — ROSUVASTATIN CALCIUM 5 MG/1
5 TABLET, FILM COATED ORAL
Qty: 90 | Refills: 1 | Status: ACTIVE | COMMUNITY
Start: 2023-03-31 | End: 1900-01-01

## 2024-04-29 RX ORDER — TERIPARATIDE 250 UG/ML
620 INJECTION, SOLUTION SUBCUTANEOUS
Qty: 1 | Refills: 5 | Status: DISCONTINUED | COMMUNITY
Start: 2024-01-22 | End: 2024-02-05

## 2024-05-29 LAB
HBA1C MFR BLD HPLC: 7.2
LDLC SERPL DIRECT ASSAY-MCNC: 101
MICROALBUMIN/CREAT 24H UR-RTO: 9

## 2024-05-30 ENCOUNTER — APPOINTMENT (OUTPATIENT)
Dept: ENDOCRINOLOGY | Facility: CLINIC | Age: 74
End: 2024-05-30
Payer: MEDICARE

## 2024-05-30 VITALS
HEART RATE: 74 BPM | RESPIRATION RATE: 16 BRPM | BODY MASS INDEX: 33.42 KG/M2 | WEIGHT: 177 LBS | SYSTOLIC BLOOD PRESSURE: 130 MMHG | HEIGHT: 61 IN | OXYGEN SATURATION: 99 % | DIASTOLIC BLOOD PRESSURE: 64 MMHG

## 2024-05-30 DIAGNOSIS — E78.5 HYPERLIPIDEMIA, UNSPECIFIED: ICD-10-CM

## 2024-05-30 DIAGNOSIS — E10.65 TYPE 1 DIABETES MELLITUS WITH HYPERGLYCEMIA: ICD-10-CM

## 2024-05-30 DIAGNOSIS — E03.9 HYPOTHYROIDISM, UNSPECIFIED: ICD-10-CM

## 2024-05-30 DIAGNOSIS — E04.1 NONTOXIC SINGLE THYROID NODULE: ICD-10-CM

## 2024-05-30 LAB — GLUCOSE BLDC GLUCOMTR-MCNC: 202

## 2024-05-30 PROCEDURE — 82962 GLUCOSE BLOOD TEST: CPT

## 2024-05-30 PROCEDURE — 99214 OFFICE O/P EST MOD 30 MIN: CPT

## 2024-05-30 PROCEDURE — G2211 COMPLEX E/M VISIT ADD ON: CPT

## 2024-05-30 PROCEDURE — 95251 CONT GLUC MNTR ANALYSIS I&R: CPT

## 2024-05-30 RX ORDER — DENOSUMAB 60 MG/ML
60 INJECTION SUBCUTANEOUS
Refills: 0 | Status: DISCONTINUED | COMMUNITY
End: 2024-05-30

## 2024-05-30 RX ORDER — TERIPARATIDE 250 UG/ML
600 INJECTION, SOLUTION SUBCUTANEOUS
Refills: 0 | Status: ACTIVE | COMMUNITY

## 2024-05-30 NOTE — HISTORY OF PRESENT ILLNESS
[FreeTextEntry1] : Follow up of T1DM,  hypothyroidism,  small thyroid nodule and OP  PMH of breast cancer s/p mastectomy and has ANA mutation.   History of IgA deficiency.  At time of diagnosis of DM, was getting dexamethasone with IVIG infusion. Also has NAFLD and seeing GI.     Her insurance does not cover GLP-1 agonist for weight loss.  Had DXA done with Dr. Wakefield showing worsening osteoporosis.   Does not want to take bisphosphonates.  Has extensive dental history with multiple crowns.   Forteo was initially too costly for patient.   Prolia was started in January 2023 through Rheumatology.   Insurance now covers generic Teriparitide and started this in 2024.   Quality:  Type 1 DM  (+ BRYSON -65), initially thought to be type 2 and started on metformin.   Severity:  uncontrolled.  Duration of diabetes:  diagnosed Jan 2020 Onset:  found on routine labs, occurred in setting of dexamethasone use given with IVIG infusions for IgA deficiency Associated Complications/ Symptoms:  No known microvascular complications Modifying Factors:  Better with insulin  SMBG:  Using Dexcom G7 CGM (prior to CGM, was testing 4 times a day).    She currently uses 1 strip per day while on CGM for calibration purposes and for testing when CGM is in warm up. Reviewed CGM download:  average BG is 163 mg/dl with SD of 35.  74% of BG within target range, 26% above target and 0 % below target.      Having some fasting hyperglycemia and elevated BG after coffee in morning.     She is contemplating pump therapy.    Current Diabetic Medication Regimen: Basaglar 8 units qhs  Humalog  with meals using  ICR 1:7.

## 2024-05-30 NOTE — ASSESSMENT
[FreeTextEntry1] : 73 year old female with Type 1 DM, hypothyroidism, hyperlipidemia, thyroid nodule,  obesity complicated by NAFLD and severe OP here for endocrine follow up.   Her diabetes control is suboptimal.  1. Type 1 DM-   Recommended insulin pump therapy.  Will arrange for CDE visit to discuss pump options.  In the meantime, increase Basaglar to 10 units qhs.  Take 1 unit of humalog with morning coffee to prevent hyperglycemia.   2. hypothyroidism- continue current dose of LT4 3. Hyperlipidemia-  Continue Rosuvastatin 5 mg daily.   4. Thyroid nodule- nodules were very small on imaging in 2022.   Close follow up is not needed at this time.      5. Osteoporosis-  Continue weekly Vitamin D.      CGM medical necessity statement: Patient tests blood glucose 4 or more times a day and injects insulin 3 or more times per day. She has been seen regularly at this office within the past 6 months. She requires frequent adjustments to her insulin regimen on the basis of therapeutic CGM results using ISF of 1:50.   Follow up in 4 months.

## 2024-05-30 NOTE — DATA REVIEWED
[FreeTextEntry1] : LABS: 1/22/2024: UACR 34  TSH 1.29 25(OH)D 40 A1c 7.4%  5/23/2023: A1c 7%  TSH 1.8  3/28/2023: TSH 2.15 25(OH)D 19  1/11/2019: AST  175,  ALT  130 Creatinine 0.69 C-peptide 2.82 BRYSON-65 antibody:  118  A1c 10.7%  Thyroid US:  5/26/2022: RUP 0.3 cm cyst RUP 0.3 cm rim calcified nodule  DXA 8/8/2022: L spine T score -4.5 Left femoral Neck -2.7 Left total Hip -1.9

## 2024-06-03 ENCOUNTER — APPOINTMENT (OUTPATIENT)
Dept: RHEUMATOLOGY | Facility: CLINIC | Age: 74
End: 2024-06-03
Payer: MEDICARE

## 2024-06-03 VITALS
WEIGHT: 174 LBS | BODY MASS INDEX: 32.85 KG/M2 | DIASTOLIC BLOOD PRESSURE: 66 MMHG | SYSTOLIC BLOOD PRESSURE: 125 MMHG | TEMPERATURE: 98 F | HEART RATE: 56 BPM | HEIGHT: 61 IN | OXYGEN SATURATION: 96 %

## 2024-06-03 DIAGNOSIS — M81.0 AGE-RELATED OSTEOPOROSIS W/OUT CURRENT PATHOLOGICAL FRACTURE: ICD-10-CM

## 2024-06-03 DIAGNOSIS — Z79.899 OTHER LONG TERM (CURRENT) DRUG THERAPY: ICD-10-CM

## 2024-06-03 DIAGNOSIS — M15.9 POLYOSTEOARTHRITIS, UNSPECIFIED: ICD-10-CM

## 2024-06-03 PROCEDURE — 99214 OFFICE O/P EST MOD 30 MIN: CPT

## 2024-06-03 NOTE — ASSESSMENT
[FreeTextEntry1] : 72 y/o female with T1DM and NAFLD, osteoporosis presented as follow up for osteoporosis.  Pt has Hx of breast CA s/p mastectomy and lymph node excision, hysterectomy, tamoxifen, currently in remission. Pt has developed T1DM, receives IVIG by hem/onc for "no immune system".  Pt follows with GI for NAFLD.  Pt did not want to start bisphosphonates due to Hx of extensive dental history with multiple crowns, bridge that needs to be changed. Pt is pending possible tooth removal in the future. Pt was Rx'd Forteo by endocrinology but is unable to pay for it ($1688-1733/month). Pt was ordered Prolia by oncologist but is still >$800 out of pocket.  Hx of R femur and tibia fracture from 2012 related to injury (90lb dog running into her). No pathological fracture.  DXA from 8/2022 with lowest T-score L-spine -4.5 (worsening compared to prior DXA scans).   When pt was seen by me in 9/2022, I agreed with use of anabolic agents for pt's severe osteoporosis but pt could not afford. Since last visit, pt has been receiving Prolia by hem/onc (pt follows for hypogammaglobulinemia and gets IVIG). Last Prolia dose ~8/2023. Pt now has a new insurance and would like to try to try for Forteo again.  Pt has been started on Forteo since 3/2024 without any side effects.  Pt last had DXA 8/2022 and is due for repeat DXA in 8/2024.  - Advised to send me lab results from PCP yesterday. - c/w Forteo. Side effects of teriparatide were discussed with the pt in detail including headache, nausea, arthralgia, orthostasis, flushing, hypercalcemia, hyperuricemia.  Avoid if Pagets disease, increased alkaline phosphatase, skeletal metastasis, myeloma, severe renal dysfunction.  - Continue Ca and Vit D supplementation (On high dose weekly of Vit D, last Vit D borderline).  Advised to reduce or eliminate tobacco, alcohol, caffeine intake, increase exercise, and minimize fall risk.  - Discussed with patient at length about treatment of OA and soft tissue injuries including oral/topical analgesics, pain therapies (yoga, chika chi, acupuncture, chiropractor, massage therapy, wax therapy, etc.), PT/OT, steroid injections, surgeries. Advised on healthy diet, exercise, smoking avoidance, weight loss, stress management, sleep hygiene, control of comorbidities to help with management of pain and improve daily function.  - Last DXA 8/2022. Next DXA after 8/2024 ordered today.  - Will contact pt with DXA results. RTC 6 months for follow up

## 2024-06-03 NOTE — PHYSICAL EXAM
[TextEntry] : GENERAL: Appears in no acute distress HEENT: EOMI, PERRLA. No conjunctival erythema. Moist mucous membranes. No nasopharyngeal ulcers NECK: Supple, no cervical lymphadenopathy, no thyromegaly CARDIOVASCULAR: RRR. S1, S2 auscultated. No murmurs or rubs. PULMONARY: Clear to auscultation b/l, no wheezes, rales, or crackles ABDOMINAL: Soft, nontender, nondistended. Bowel sounds present. No organomegaly. MSK: No active synovitis, swelling, erythema, or warmth. No Lisa's or Heberden's nodules. No deformities. SKIN: No lesions or rashes NEURO: No focal deficits. PSYCH: AAOx3. Normal affect and thought process.

## 2024-06-03 NOTE — HISTORY OF PRESENT ILLNESS
[FreeTextEntry1] : HISTORY:  74 y/o female with T1DM and NAFLD, osteoporosis presented as follow up for osteoporosis.  Pt has Hx of breast CA s/p mastectomy and lymph node excision, hysterectomy, tamoxifen, currently in remission. Pt has developed T1DM, receives IVIG by hem/onc for "no immune system".  Pt follows with GI for NAFLD.  Pt did not want to start bisphosphonates due to Hx of extensive dental history with multiple crowns, bridge that needs to be changed. Pt is pending possible tooth removal in the future. Pt was Rx'd Forteo by endocrinology but is unable to pay for it ($0997-0356/month). Pt was ordered Prolia by oncologist but is still >$800 out of pocket.  Hx of R femur and tibia fracture from 2012 related to injury (90lb dog running into her). No pathological fracture.  DXA from 8/2022 with lowest T-score L-spine -4.5 (worsening compared to prior DXA scans).   When pt was seen by me in 9/2022, I agreed with use of anabolic agents for pt's severe osteoporosis but pt could not afford. Since last visit, pt has been receiving Prolia by hem/onc (pt follows for hypogammaglobulinemia and gets IVIG). Last Prolia dose ~8/2023. Pt now has a new insurance and would like to try to try for Forteo again.   INTERVAL HISTORY:  Pt has been started on Forteo since 3/2024 without any side effects. Pt last had DXA 8/2022 and is due for repeat DXA in 8/2024.  WORKUP:  DXA (8/2022): Lowest T-score L-spine -4.5, L femoral neck -2.7, L total hip -1.9, R femoral neck -2.7, R total hip -2.2

## 2024-06-07 ENCOUNTER — APPOINTMENT (OUTPATIENT)
Dept: ENDOCRINOLOGY | Facility: CLINIC | Age: 74
End: 2024-06-07
Payer: MEDICARE

## 2024-06-07 PROCEDURE — G0108 DIAB MANAGE TRN  PER INDIV: CPT

## 2024-06-26 ENCOUNTER — APPOINTMENT (OUTPATIENT)
Dept: ENDOCRINOLOGY | Facility: CLINIC | Age: 74
End: 2024-06-26
Payer: COMMERCIAL

## 2024-06-26 PROCEDURE — P0023: CPT

## 2024-06-26 RX ORDER — URINE ACETONE TEST STRIPS
STRIP MISCELLANEOUS
Qty: 1 | Refills: 1 | Status: ACTIVE | COMMUNITY
Start: 2024-06-26 | End: 1900-01-01

## 2024-07-01 ENCOUNTER — RX RENEWAL (OUTPATIENT)
Age: 74
End: 2024-07-01

## 2024-07-03 RX ORDER — PEN NEEDLE, DIABETIC 31 GX3/16"
31G X 5 MM NEEDLE, DISPOSABLE MISCELLANEOUS
Qty: 30 | Refills: 5 | Status: ACTIVE | COMMUNITY
Start: 2024-07-01 | End: 1900-01-01

## 2024-07-05 ENCOUNTER — NON-APPOINTMENT (OUTPATIENT)
Age: 74
End: 2024-07-05

## 2024-07-10 RX ORDER — INSULIN LISPRO 100 [IU]/ML
100 INJECTION, SOLUTION INTRAVENOUS; SUBCUTANEOUS
Qty: 9 | Refills: 1 | Status: ACTIVE | COMMUNITY
Start: 2024-07-10 | End: 1900-01-01

## 2024-07-12 ENCOUNTER — NON-APPOINTMENT (OUTPATIENT)
Age: 74
End: 2024-07-12

## 2024-09-03 ENCOUNTER — NON-APPOINTMENT (OUTPATIENT)
Age: 74
End: 2024-09-03

## 2024-09-04 ENCOUNTER — NON-APPOINTMENT (OUTPATIENT)
Age: 74
End: 2024-09-04

## 2024-09-04 LAB
HBA1C MFR BLD HPLC: 6.6
LDLC SERPL DIRECT ASSAY-MCNC: 114
MICROALBUMIN/CREAT 24H UR-RTO: 6
TSH SERPL-ACNC: 1.84

## 2024-09-05 ENCOUNTER — APPOINTMENT (OUTPATIENT)
Dept: ENDOCRINOLOGY | Facility: CLINIC | Age: 74
End: 2024-09-05
Payer: MEDICARE

## 2024-09-05 VITALS
WEIGHT: 183 LBS | OXYGEN SATURATION: 99 % | DIASTOLIC BLOOD PRESSURE: 60 MMHG | BODY MASS INDEX: 34.55 KG/M2 | SYSTOLIC BLOOD PRESSURE: 122 MMHG | HEART RATE: 85 BPM | HEIGHT: 61 IN | RESPIRATION RATE: 16 BRPM

## 2024-09-05 DIAGNOSIS — E78.5 HYPERLIPIDEMIA, UNSPECIFIED: ICD-10-CM

## 2024-09-05 DIAGNOSIS — E03.9 HYPOTHYROIDISM, UNSPECIFIED: ICD-10-CM

## 2024-09-05 DIAGNOSIS — M81.0 AGE-RELATED OSTEOPOROSIS W/OUT CURRENT PATHOLOGICAL FRACTURE: ICD-10-CM

## 2024-09-05 DIAGNOSIS — E10.65 TYPE 1 DIABETES MELLITUS WITH HYPERGLYCEMIA: ICD-10-CM

## 2024-09-05 DIAGNOSIS — E04.1 NONTOXIC SINGLE THYROID NODULE: ICD-10-CM

## 2024-09-05 DIAGNOSIS — G47.33 OBSTRUCTIVE SLEEP APNEA (ADULT) (PEDIATRIC): ICD-10-CM

## 2024-09-05 PROCEDURE — 99214 OFFICE O/P EST MOD 30 MIN: CPT

## 2024-09-05 PROCEDURE — 95251 CONT GLUC MNTR ANALYSIS I&R: CPT

## 2024-09-05 PROCEDURE — G2211 COMPLEX E/M VISIT ADD ON: CPT

## 2024-09-05 NOTE — DATA REVIEWED
[FreeTextEntry1] : LABS: 1/22/2024: UACR 34  TSH 1.29 25(OH)D 40 A1c 7.4%  5/23/2023: A1c 7%  TSH 1.8  3/28/2023: TSH 2.15 25(OH)D 19  Thyroid US:  5/26/2022: RUP 0.3 cm cyst RUP 0.3 cm rim calcified nodule  DXA 8/8/2022: L spine T score -4.5 Left femoral Neck -2.7 Left total Hip -1.9

## 2024-09-05 NOTE — HISTORY OF PRESENT ILLNESS
[FreeTextEntry1] : Follow up of T1DM,  hypothyroidism,  small thyroid nodule and OP  PMH of breast cancer s/p mastectomy and has ANA mutation.   History of IgA deficiency.  At time of diagnosis of DM, was getting dexamethasone with IVIG infusion. Also has NAFLD and seeing GI.     Her insurance does not cover GLP-1 agonist for weight loss.  Had DXA done with Dr. Wakefield showing worsening osteoporosis.   Does not want to take bisphosphonates.  Has extensive dental history with multiple crowns.   Forteo was initially too costly for patient.   Prolia was started in January 2023 through Rheumatology.   Insurance now covers generic Teriparitide and started this in 2024.  Just had follow up DXA showing improvement at spine.     Quality:  Type 1 DM  (+ BRYSON -65), initially thought to be type 2 and started on metformin.   Severity:  uncontrolled.  Duration of diabetes:  diagnosed Jan 2020 Onset:  found on routine labs, occurred in setting of dexamethasone use given with IVIG infusions for IgA deficiency Associated Complications/ Symptoms:  No known microvascular complications Modifying Factors:  Better with insulin  SMBG:  Using Dexcom G7 CGM (prior to CGM, was testing 4 times a day).    She currently uses 1 strip per day while on CGM for calibration purposes and for testing when CGM is in warm up. Reviewed CGM download:  average BG is 146 mg/dl with CV of 19%.  86% of BG within target range, 14% above target and 0 % below target.    Much less glycemic excursions on pump therapy.   Current Diabetic Medication Regimen: Beta [employer] iLet bionic pancreas using Humalog started in June 2024.

## 2024-09-05 NOTE — REVIEW OF SYSTEMS
[Recent Weight Gain (___ Lbs)] : recent weight gain: [unfilled] lbs [Palpitations] : no palpitations [Nausea] : no nausea

## 2024-09-05 NOTE — ASSESSMENT
[FreeTextEntry1] : 73 year old female with Type 1 DM, hypothyroidism, hyperlipidemia, thyroid nodule, obesity complicated by NAFLD and severe OP here for endocrine follow up.   Her diabetes control is improving.     1. Type 1 DM-     Continue use of Beta Bionic ilet insulin pump.   2. hypothyroidism- continue current dose of LT4 3. Hyperlipidemia-  Continue Rosuvastatin 5 mg daily.  If LDL does not improve, consider dose increase.   4. Thyroid nodule- nodules were very small on imaging in 2022.   Close follow up is not needed at this time.      5. Osteoporosis-  Continue weekly Vitamin D.      CGM medical necessity statement: Patient tests blood glucose 4 or more times a day and injects insulin 3 or more times per day. She has been seen regularly at this office within the past 6 months. She requires frequent adjustments to her insulin regimen on the basis of therapeutic CGM results using ISF of 1:50.   Follow up in 4 months.

## 2024-09-11 ENCOUNTER — RX RENEWAL (OUTPATIENT)
Age: 74
End: 2024-09-11

## 2024-09-22 NOTE — ASSESSMENT
INFECTIOUS DISEASE PROGRESS NOTE      ASSESSMENT:   Fever without leukocytosis  Runny nose, stuffy nose, malaise, muscle aches, mild cough  Transaminitis (pre-existing?)  Asthma    CSF negative for meningitis.  Continues to have high fever without leukocytosis.  Will check CT chest/abdomen/pelvis given elevated LFTs and cough.  If no improvement, will start antibiotics    PLAN:    blood cultures no growth  CSF fluid negative, meningitis panel negative  HIV negative  RPP negative  Will check CT chest/abdomen/pelvis with contrast  Repeat blood cultures  If no improvement of fever, will start vancomycin and ceftriaxone      Labs and imaging reviewed.     DO [facility]  [phone]    -------------------------------------------------------------------------------------------------------------------    SUBJECTIVE:    Remains febrile  Has a mild cough, nonproductive  Denies any abdominal pain  Neck stiffness appears to be improving  Review of systems otherwise negative    OBJECTIVE:  Tmax Temp (24hrs), Av.7 °F (38.2 °C), Min:98.4 °F (36.9 °C), Max:102.9 °F (39.4 °C)     Last Vitals   Vitals:    24 1753   BP:    Pulse:    Resp:    Temp: (!) 102.9 °F (39.4 °C)       Gen: No apparent distress, mildly diaphoretic  HEENT: Anicteric, no thrush  CVS: Regular rate and rhythm  Lung: Clear to auscultation bilaterally  Abd: Soft, nontender, nondistended  Extr: No edema  Neuro: A and O x 3  Skin: No rashes      LAB:  Recent Labs     24  1034 24  0519   WBC 4.9 5.6   HGB 16.5 14.0   HCT 49.5 41.5    217   MCV 82.2 79.5       Recent Labs   Lab 24  0519 09/20/24  1034   SODIUM 138 139   POTASSIUM 4.0 4.1   CHLORIDE 108 108   CO2 24 25   BUN 11 12   CREATININE 0.95 0.82   GLUCOSE 108* 111*   CALCIUM 8.8 9.3              IMAGING:  Chest x-ray reviewed     MICROBIOLOGY:  CSF culture-no growth     ANTIBIOTICS:  N/A          Note:  Assessment and Plan have  [FreeTextEntry1] : 70 year old female with Type 1 DM, hypothyroidism and  hyperlipidemia.  Her glycemic control has improved.  \par \par 1.  Type 1 DM-  Continue current insulin regimen.  Repeat A1c in 3-4 months.  \par 2.  hypothyroidism-  continue LT4\par 3.  Hyperlipidemia-    Unable to take statin due to elevated LFTs.  Consider Zetia in future.  \par   been moved to the top of the screen for ease of the viewer such that the plan can be seen without scrolling to the bottom of the note.

## 2024-11-06 ENCOUNTER — APPOINTMENT (OUTPATIENT)
Dept: RHEUMATOLOGY | Facility: CLINIC | Age: 74
End: 2024-11-06
Payer: MEDICARE

## 2024-11-06 DIAGNOSIS — M81.0 AGE-RELATED OSTEOPOROSIS W/OUT CURRENT PATHOLOGICAL FRACTURE: ICD-10-CM

## 2024-11-06 DIAGNOSIS — Z79.899 OTHER LONG TERM (CURRENT) DRUG THERAPY: ICD-10-CM

## 2024-11-06 PROCEDURE — 99214 OFFICE O/P EST MOD 30 MIN: CPT

## 2024-11-22 ENCOUNTER — APPOINTMENT (OUTPATIENT)
Dept: ENDOCRINOLOGY | Facility: CLINIC | Age: 74
End: 2024-11-22
Payer: MEDICARE

## 2024-11-22 PROCEDURE — G0108 DIAB MANAGE TRN  PER INDIV: CPT

## 2024-12-02 ENCOUNTER — APPOINTMENT (OUTPATIENT)
Dept: OPHTHALMOLOGY | Facility: CLINIC | Age: 74
End: 2024-12-02

## 2024-12-03 ENCOUNTER — NON-APPOINTMENT (OUTPATIENT)
Age: 74
End: 2024-12-03

## 2024-12-05 RX ORDER — ROSUVASTATIN CALCIUM 10 MG/1
10 TABLET, FILM COATED ORAL DAILY
Qty: 90 | Refills: 1 | Status: ACTIVE | COMMUNITY
Start: 2024-12-05 | End: 1900-01-01

## 2025-01-22 LAB
HBA1C MFR BLD HPLC: 6.7
LDLC SERPL DIRECT ASSAY-MCNC: 134
TSH SERPL-ACNC: 1.37

## 2025-01-23 ENCOUNTER — APPOINTMENT (OUTPATIENT)
Dept: ENDOCRINOLOGY | Facility: CLINIC | Age: 75
End: 2025-01-23

## 2025-01-31 ENCOUNTER — APPOINTMENT (OUTPATIENT)
Dept: ENDOCRINOLOGY | Facility: CLINIC | Age: 75
End: 2025-01-31
Payer: MEDICARE

## 2025-01-31 VITALS
RESPIRATION RATE: 16 BRPM | WEIGHT: 186 LBS | OXYGEN SATURATION: 97 % | HEART RATE: 63 BPM | HEIGHT: 61 IN | SYSTOLIC BLOOD PRESSURE: 126 MMHG | BODY MASS INDEX: 35.12 KG/M2 | DIASTOLIC BLOOD PRESSURE: 62 MMHG

## 2025-01-31 DIAGNOSIS — E04.1 NONTOXIC SINGLE THYROID NODULE: ICD-10-CM

## 2025-01-31 DIAGNOSIS — Z86.79 PERSONAL HISTORY OF OTHER DISEASES OF THE CIRCULATORY SYSTEM: ICD-10-CM

## 2025-01-31 DIAGNOSIS — M81.0 AGE-RELATED OSTEOPOROSIS W/OUT CURRENT PATHOLOGICAL FRACTURE: ICD-10-CM

## 2025-01-31 DIAGNOSIS — E66.811 OBESITY, CLASS 1: ICD-10-CM

## 2025-01-31 DIAGNOSIS — E78.5 HYPERLIPIDEMIA, UNSPECIFIED: ICD-10-CM

## 2025-01-31 DIAGNOSIS — E10.65 TYPE 1 DIABETES MELLITUS WITH HYPERGLYCEMIA: ICD-10-CM

## 2025-01-31 DIAGNOSIS — E03.9 HYPOTHYROIDISM, UNSPECIFIED: ICD-10-CM

## 2025-01-31 LAB — GLUCOSE BLDC GLUCOMTR-MCNC: 139

## 2025-01-31 PROCEDURE — 99214 OFFICE O/P EST MOD 30 MIN: CPT

## 2025-01-31 PROCEDURE — G2211 COMPLEX E/M VISIT ADD ON: CPT

## 2025-01-31 PROCEDURE — 82962 GLUCOSE BLOOD TEST: CPT

## 2025-01-31 PROCEDURE — 95251 CONT GLUC MNTR ANALYSIS I&R: CPT

## 2025-01-31 RX ORDER — INSULIN ASPART 100 [IU]/ML
100 INJECTION, SOLUTION INTRAVENOUS; SUBCUTANEOUS
Qty: 10 | Refills: 3 | Status: ACTIVE | COMMUNITY
Start: 2025-01-31 | End: 1900-01-01

## 2025-02-05 RX ORDER — SEMAGLUTIDE 0.25 MG/.5ML
0.25 INJECTION, SOLUTION SUBCUTANEOUS
Qty: 3 | Refills: 0 | Status: ACTIVE | COMMUNITY
Start: 2025-01-31

## 2025-04-24 RX ORDER — BLOOD-GLUCOSE SENSOR
EACH MISCELLANEOUS
Qty: 9 | Refills: 3 | Status: ACTIVE | COMMUNITY
Start: 2025-04-24 | End: 1900-01-01

## 2025-05-07 ENCOUNTER — APPOINTMENT (OUTPATIENT)
Dept: RHEUMATOLOGY | Facility: CLINIC | Age: 75
End: 2025-05-07
Payer: MEDICARE

## 2025-05-07 VITALS
OXYGEN SATURATION: 96 % | DIASTOLIC BLOOD PRESSURE: 70 MMHG | HEIGHT: 61.5 IN | SYSTOLIC BLOOD PRESSURE: 126 MMHG | HEART RATE: 61 BPM | TEMPERATURE: 97.9 F | WEIGHT: 171 LBS | BODY MASS INDEX: 31.87 KG/M2

## 2025-05-07 DIAGNOSIS — Z79.899 OTHER LONG TERM (CURRENT) DRUG THERAPY: ICD-10-CM

## 2025-05-07 DIAGNOSIS — M25.50 PAIN IN UNSPECIFIED JOINT: ICD-10-CM

## 2025-05-07 DIAGNOSIS — M81.0 AGE-RELATED OSTEOPOROSIS W/OUT CURRENT PATHOLOGICAL FRACTURE: ICD-10-CM

## 2025-05-07 PROCEDURE — 99215 OFFICE O/P EST HI 40 MIN: CPT

## 2025-05-07 PROCEDURE — G2211 COMPLEX E/M VISIT ADD ON: CPT

## 2025-05-07 RX ORDER — MELOXICAM 7.5 MG/1
7.5 TABLET ORAL
Qty: 30 | Refills: 3 | Status: ACTIVE | COMMUNITY
Start: 2025-05-07 | End: 1900-01-01

## 2025-05-07 RX ORDER — ERGOCALCIFEROL 1.25 MG/1
1.25 MG CAPSULE, LIQUID FILLED ORAL
Qty: 8 | Refills: 2 | Status: ACTIVE | COMMUNITY
Start: 2025-05-07 | End: 1900-01-01

## 2025-05-08 ENCOUNTER — RX RENEWAL (OUTPATIENT)
Age: 75
End: 2025-05-08

## 2025-06-19 ENCOUNTER — RX RENEWAL (OUTPATIENT)
Age: 75
End: 2025-06-19

## 2025-06-19 RX ORDER — SEMAGLUTIDE 1.7 MG/.75ML
1.7 INJECTION, SOLUTION SUBCUTANEOUS
Qty: 1 | Refills: 0 | Status: ACTIVE | COMMUNITY
Start: 2025-06-19 | End: 1900-01-01

## 2025-06-27 LAB
HBA1C MFR BLD HPLC: 5.7
LDLC SERPL DIRECT ASSAY-MCNC: 81
MICROALBUMIN/CREAT 24H UR-RTO: 7.9
TSH SERPL-ACNC: 1.25

## 2025-06-30 ENCOUNTER — APPOINTMENT (OUTPATIENT)
Dept: ENDOCRINOLOGY | Facility: CLINIC | Age: 75
End: 2025-06-30
Payer: MEDICARE

## 2025-06-30 VITALS
SYSTOLIC BLOOD PRESSURE: 120 MMHG | RESPIRATION RATE: 16 BRPM | HEART RATE: 60 BPM | DIASTOLIC BLOOD PRESSURE: 80 MMHG | BODY MASS INDEX: 30.1 KG/M2 | TEMPERATURE: 98 F | HEIGHT: 61.5 IN | OXYGEN SATURATION: 97 % | WEIGHT: 161.5 LBS

## 2025-06-30 PROCEDURE — 95251 CONT GLUC MNTR ANALYSIS I&R: CPT

## 2025-06-30 PROCEDURE — 76536 US EXAM OF HEAD AND NECK: CPT

## 2025-06-30 PROCEDURE — 99214 OFFICE O/P EST MOD 30 MIN: CPT

## 2025-07-08 ENCOUNTER — RX RENEWAL (OUTPATIENT)
Age: 75
End: 2025-07-08

## 2025-07-08 RX ORDER — TERIPARATIDE 250 UG/ML
560 INJECTION, SOLUTION SUBCUTANEOUS
Qty: 2.24 | Refills: 2 | Status: ACTIVE | COMMUNITY
Start: 2025-07-08 | End: 1900-01-01